# Patient Record
Sex: FEMALE | Race: WHITE | NOT HISPANIC OR LATINO
[De-identification: names, ages, dates, MRNs, and addresses within clinical notes are randomized per-mention and may not be internally consistent; named-entity substitution may affect disease eponyms.]

---

## 2015-07-06 RX ORDER — DILTIAZEM HCL 120 MG
1 CAPSULE, EXT RELEASE 24 HR ORAL
Qty: 0 | Refills: 0 | DISCHARGE
Start: 2015-07-06

## 2015-07-06 RX ORDER — ATORVASTATIN CALCIUM 80 MG/1
1 TABLET, FILM COATED ORAL
Qty: 0 | Refills: 0 | DISCHARGE
Start: 2015-07-06

## 2015-07-06 RX ORDER — DABIGATRAN ETEXILATE MESYLATE 150 MG/1
1 CAPSULE ORAL
Qty: 0 | Refills: 0 | DISCHARGE
Start: 2015-07-06

## 2017-01-03 ENCOUNTER — APPOINTMENT (OUTPATIENT)
Dept: PULMONOLOGY | Facility: CLINIC | Age: 82
End: 2017-01-03

## 2017-01-03 VITALS — OXYGEN SATURATION: 94 % | DIASTOLIC BLOOD PRESSURE: 70 MMHG | HEART RATE: 117 BPM | SYSTOLIC BLOOD PRESSURE: 110 MMHG

## 2017-02-14 ENCOUNTER — APPOINTMENT (OUTPATIENT)
Dept: PULMONOLOGY | Facility: CLINIC | Age: 82
End: 2017-02-14

## 2017-02-14 VITALS
HEART RATE: 107 BPM | OXYGEN SATURATION: 98 % | DIASTOLIC BLOOD PRESSURE: 74 MMHG | BODY MASS INDEX: 23.03 KG/M2 | WEIGHT: 130 LBS | SYSTOLIC BLOOD PRESSURE: 108 MMHG

## 2017-02-14 DIAGNOSIS — J45.901 ACUTE BRONCHITIS, UNSPECIFIED: ICD-10-CM

## 2017-02-14 DIAGNOSIS — R04.2 HEMOPTYSIS: ICD-10-CM

## 2017-02-14 DIAGNOSIS — J20.9 ACUTE BRONCHITIS, UNSPECIFIED: ICD-10-CM

## 2017-03-21 ENCOUNTER — APPOINTMENT (OUTPATIENT)
Dept: ORTHOPEDIC SURGERY | Facility: CLINIC | Age: 82
End: 2017-03-21

## 2017-03-21 VITALS — WEIGHT: 135 LBS | HEIGHT: 60 IN | BODY MASS INDEX: 26.5 KG/M2

## 2017-03-21 DIAGNOSIS — M23.91 UNSPECIFIED INTERNAL DERANGEMENT OF RIGHT KNEE: ICD-10-CM

## 2017-05-27 ENCOUNTER — EMERGENCY (EMERGENCY)
Facility: HOSPITAL | Age: 82
LOS: 1 days | Discharge: HOME | End: 2017-05-27
Attending: EMERGENCY MEDICINE | Admitting: EMERGENCY MEDICINE
Payer: MEDICARE

## 2017-05-27 VITALS
OXYGEN SATURATION: 97 % | SYSTOLIC BLOOD PRESSURE: 124 MMHG | DIASTOLIC BLOOD PRESSURE: 87 MMHG | RESPIRATION RATE: 18 BRPM | TEMPERATURE: 98 F | HEART RATE: 97 BPM

## 2017-05-27 VITALS
RESPIRATION RATE: 18 BRPM | DIASTOLIC BLOOD PRESSURE: 84 MMHG | SYSTOLIC BLOOD PRESSURE: 142 MMHG | OXYGEN SATURATION: 97 % | HEART RATE: 102 BPM | TEMPERATURE: 98 F

## 2017-05-27 DIAGNOSIS — Z98.49 CATARACT EXTRACTION STATUS, UNSPECIFIED EYE: ICD-10-CM

## 2017-05-27 DIAGNOSIS — Z98.890 OTHER SPECIFIED POSTPROCEDURAL STATES: ICD-10-CM

## 2017-05-27 DIAGNOSIS — Z79.891 LONG TERM (CURRENT) USE OF OPIATE ANALGESIC: ICD-10-CM

## 2017-05-27 DIAGNOSIS — I48.91 UNSPECIFIED ATRIAL FIBRILLATION: ICD-10-CM

## 2017-05-27 DIAGNOSIS — Z79.899 OTHER LONG TERM (CURRENT) DRUG THERAPY: ICD-10-CM

## 2017-05-27 DIAGNOSIS — Z98.42 CATARACT EXTRACTION STATUS, LEFT EYE: Chronic | ICD-10-CM

## 2017-05-27 DIAGNOSIS — I10 ESSENTIAL (PRIMARY) HYPERTENSION: ICD-10-CM

## 2017-05-27 DIAGNOSIS — K21.9 GASTRO-ESOPHAGEAL REFLUX DISEASE WITHOUT ESOPHAGITIS: ICD-10-CM

## 2017-05-27 DIAGNOSIS — Z98.89 OTHER SPECIFIED POSTPROCEDURAL STATES: Chronic | ICD-10-CM

## 2017-05-27 DIAGNOSIS — Z86.73 PERSONAL HISTORY OF TRANSIENT ISCHEMIC ATTACK (TIA), AND CEREBRAL INFARCTION WITHOUT RESIDUAL DEFICITS: ICD-10-CM

## 2017-05-27 DIAGNOSIS — M54.6 PAIN IN THORACIC SPINE: ICD-10-CM

## 2017-05-27 DIAGNOSIS — M54.9 DORSALGIA, UNSPECIFIED: ICD-10-CM

## 2017-05-27 DIAGNOSIS — E78.5 HYPERLIPIDEMIA, UNSPECIFIED: ICD-10-CM

## 2017-05-27 PROCEDURE — 99283 EMERGENCY DEPT VISIT LOW MDM: CPT | Mod: 25

## 2017-05-27 PROCEDURE — 99284 EMERGENCY DEPT VISIT MOD MDM: CPT | Mod: 25,GC

## 2017-05-27 PROCEDURE — 93010 ELECTROCARDIOGRAM REPORT: CPT

## 2017-05-27 PROCEDURE — 93005 ELECTROCARDIOGRAM TRACING: CPT

## 2017-05-27 RX ORDER — ACETAMINOPHEN 500 MG
650 TABLET ORAL ONCE
Qty: 0 | Refills: 0 | Status: COMPLETED | OUTPATIENT
Start: 2017-05-27 | End: 2017-05-27

## 2017-05-27 RX ADMIN — Medication 650 MILLIGRAM(S): at 04:52

## 2017-05-27 RX ADMIN — Medication 650 MILLIGRAM(S): at 05:05

## 2017-05-27 NOTE — ED PROVIDER NOTE - PLAN OF CARE
You were seen in the Emergency Department for back pain. Your examination and lab tests were reassuring. Please follow up with your regular physician this week for reevaluation. Please return to the Emergency Department if you have any new concerning symptoms such as severe pain, weakness, chest pain, shortness of breath or any other concerning symptoms. COntinue to take tylenol as needed for pain.

## 2017-05-27 NOTE — ED PROVIDER NOTE - OBJECTIVE STATEMENT
86yo female p/w back and shoulder pain since 8pm. Pain is left shoulder radiating down back, aggravated by movement, and palpation, alleviated by laying still, sharp. Pt. denies any trauma, weakness, numbness, chest pain, shortness of breath, falls. 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 PCP: ILDA Luna. Patient walks with walker at home.

## 2017-05-27 NOTE — ED ADULT NURSE NOTE - OBJECTIVE STATEMENT
85y female brought in by EMS c/o back pain. A&Ox3,  at bedside. Hx of central tremor causing voice to shake, CVA '12 with residual right sided weakness at baseline, afib on Pradaxa, right knee surgery. Today reports left upper back pain starting last night at 20:00, Left upper back pain wraps around back radiating to right hip. Patient denies trauma, denies taking any pain medication. Denies urinary symptoms, chest pain, sob, fever/chills, n/v/d.

## 2017-05-27 NOTE — ED PROVIDER NOTE - MEDICAL DECISION MAKING DETAILS
****ATTENDING**** 84yo female hx Afib on pradaxa, s/p CVA w right sided weakness, ambulatory w assistance pw L side back pain. Pain is sharp localized to upper back and worse w movement. Denies any trauma. No HA, neck pain, chest pain, abd pain, dysuria. No cough, URI, fever or chills. Patient did not take anything for the pain and was concerned that she is having a "heart attack". Patient on exam, Patient is awake,alert,oriented x 3. Patient is well appearing and in no acute distress. Patient's chest is clear to ausculation, +s1s2. Abdomen is soft nd/nt +BS. Back: No midline C/T/L tenderness. + reproducible paraspinal trapezius and upper thoracic tenderness. No cvat, No rash. Extremity with no swelling or calf tenderness.   Patient's EKG reviewed, Afib w st changes that are similar to ekg in 2015. Pt denies any chest pain or sob, pleurisy. Pain is reproducible on exam. Patient states she was just concerned and wanted to maek sure everything was okay. Recommended blood work and xray chest, patient declined and states she will take tylenol and wants to go home. Declined UA also. Family at bedside.

## 2017-05-27 NOTE — ED PROVIDER NOTE - CARE PLAN
Principal Discharge DX:	Back pain Principal Discharge DX:	Back pain  Instructions for follow-up, activity and diet:	You were seen in the Emergency Department for back pain. Your examination and lab tests were reassuring. Please follow up with your regular physician this week for reevaluation. Please return to the Emergency Department if you have any new concerning symptoms such as severe pain, weakness, chest pain, shortness of breath or any other concerning symptoms. COntinue to take tylenol as needed for pain.

## 2017-05-27 NOTE — ED PROVIDER NOTE - NEUROLOGICAL, MLM
Alert and oriented, no focal deficits, no motor or sensory deficits. right lower extremity weakness(no change per family/patient)

## 2017-05-27 NOTE — ED PROVIDER NOTE - PROGRESS NOTE DETAILS
****ATTENDING**** Patient states she feels much better and wants to go home. Declined blood work and xray understands and has capacity to make decisions. Explained to return for any worsening symptoms, and follow up with Dr. Adrian Luna. [id number]

## 2017-05-27 NOTE — ED ADULT NURSE NOTE - PMH
Atrial fibrillation    CVA (cerebral vascular accident)    GERD (gastroesophageal reflux disease)    HTN (Hypertension)    Hyperlipidemia    Lumbar stenosis    TIA (Transient Ischemic Attack)    Tremor, Essential

## 2017-06-13 ENCOUNTER — APPOINTMENT (OUTPATIENT)
Dept: PULMONOLOGY | Facility: CLINIC | Age: 82
End: 2017-06-13

## 2017-06-13 VITALS
OXYGEN SATURATION: 99 % | HEIGHT: 60 IN | WEIGHT: 135 LBS | BODY MASS INDEX: 26.5 KG/M2 | HEART RATE: 119 BPM | SYSTOLIC BLOOD PRESSURE: 110 MMHG | RESPIRATION RATE: 17 BRPM | DIASTOLIC BLOOD PRESSURE: 70 MMHG

## 2017-06-13 DIAGNOSIS — Z78.9 OTHER SPECIFIED HEALTH STATUS: ICD-10-CM

## 2017-06-13 DIAGNOSIS — Z86.73 PERSONAL HISTORY OF TRANSIENT ISCHEMIC ATTACK (TIA), AND CEREBRAL INFARCTION W/OUT RESIDUAL DEFICITS: ICD-10-CM

## 2017-06-13 DIAGNOSIS — Z72.3 LACK OF PHYSICAL EXERCISE: ICD-10-CM

## 2017-06-13 DIAGNOSIS — Z86.79 PERSONAL HISTORY OF OTHER DISEASES OF THE CIRCULATORY SYSTEM: ICD-10-CM

## 2017-06-13 RX ORDER — PREDNISONE 10 MG/1
10 TABLET ORAL
Qty: 100 | Refills: 0 | Status: DISCONTINUED | COMMUNITY
Start: 2017-01-03 | End: 2017-06-13

## 2017-06-30 ENCOUNTER — APPOINTMENT (OUTPATIENT)
Dept: INTERNAL MEDICINE | Facility: CLINIC | Age: 82
End: 2017-06-30

## 2017-06-30 ENCOUNTER — NON-APPOINTMENT (OUTPATIENT)
Age: 82
End: 2017-06-30

## 2017-06-30 ENCOUNTER — LABORATORY RESULT (OUTPATIENT)
Age: 82
End: 2017-06-30

## 2017-06-30 VITALS — WEIGHT: 132 LBS | BODY MASS INDEX: 25.91 KG/M2 | HEIGHT: 60 IN

## 2017-06-30 VITALS — DIASTOLIC BLOOD PRESSURE: 70 MMHG | HEART RATE: 100 BPM | RESPIRATION RATE: 14 BRPM | SYSTOLIC BLOOD PRESSURE: 130 MMHG

## 2017-06-30 DIAGNOSIS — I63.9 CEREBRAL INFARCTION, UNSPECIFIED: ICD-10-CM

## 2017-06-30 RX ORDER — LEVALBUTEROL HYDROCHLORIDE 1.25 MG/3ML
1.25 SOLUTION RESPIRATORY (INHALATION)
Qty: 90 | Refills: 3 | Status: DISCONTINUED | COMMUNITY
Start: 2017-01-03 | End: 2017-06-30

## 2017-06-30 RX ORDER — OMEPRAZOLE 20 MG/1
TABLET, DELAYED RELEASE ORAL
Refills: 0 | Status: ACTIVE | COMMUNITY

## 2017-06-30 RX ORDER — BUDESONIDE 0.5 MG/2ML
0.5 INHALANT ORAL TWICE DAILY
Qty: 60 | Refills: 4 | Status: DISCONTINUED | COMMUNITY
Start: 2017-01-03 | End: 2017-06-30

## 2017-07-20 LAB
APPEARANCE: ABNORMAL
BILIRUBIN URINE: NEGATIVE
BLOOD URINE: NEGATIVE
COLOR: YELLOW
GLUCOSE QUALITATIVE U: NORMAL MG/DL
KETONES URINE: NEGATIVE
LEUKOCYTE ESTERASE URINE: NEGATIVE
NITRITE URINE: NEGATIVE
PH URINE: 6.5
PROTEIN URINE: NEGATIVE MG/DL
SPECIFIC GRAVITY URINE: 1.02
UROBILINOGEN URINE: NORMAL MG/DL

## 2017-09-07 ENCOUNTER — APPOINTMENT (OUTPATIENT)
Dept: PULMONOLOGY | Facility: CLINIC | Age: 82
End: 2017-09-07
Payer: MEDICARE

## 2017-09-07 VITALS
HEIGHT: 65 IN | DIASTOLIC BLOOD PRESSURE: 60 MMHG | OXYGEN SATURATION: 100 % | SYSTOLIC BLOOD PRESSURE: 130 MMHG | HEART RATE: 108 BPM | BODY MASS INDEX: 22.49 KG/M2 | WEIGHT: 135 LBS | RESPIRATION RATE: 16 BRPM

## 2017-09-07 PROCEDURE — 99214 OFFICE O/P EST MOD 30 MIN: CPT | Mod: 25

## 2017-09-07 PROCEDURE — 94010 BREATHING CAPACITY TEST: CPT

## 2017-09-07 RX ORDER — CLARITHROMYCIN 500 MG/1
500 TABLET, FILM COATED ORAL
Qty: 20 | Refills: 0 | Status: DISCONTINUED | COMMUNITY
Start: 2017-06-23 | End: 2017-09-07

## 2017-09-07 RX ORDER — AZILSARTAN KAMEDOXOMIL 80 MG/1
TABLET ORAL
Refills: 0 | Status: DISCONTINUED | COMMUNITY
End: 2017-09-07

## 2018-01-11 ENCOUNTER — APPOINTMENT (OUTPATIENT)
Dept: PULMONOLOGY | Facility: CLINIC | Age: 83
End: 2018-01-11
Payer: MEDICARE

## 2018-01-11 VITALS
WEIGHT: 135 LBS | SYSTOLIC BLOOD PRESSURE: 120 MMHG | BODY MASS INDEX: 22.49 KG/M2 | HEART RATE: 102 BPM | OXYGEN SATURATION: 93 % | DIASTOLIC BLOOD PRESSURE: 80 MMHG | HEIGHT: 65 IN

## 2018-01-11 PROCEDURE — 99214 OFFICE O/P EST MOD 30 MIN: CPT | Mod: 25

## 2018-01-11 PROCEDURE — 94640 AIRWAY INHALATION TREATMENT: CPT

## 2018-05-15 ENCOUNTER — APPOINTMENT (OUTPATIENT)
Dept: PULMONOLOGY | Facility: CLINIC | Age: 83
End: 2018-05-15

## 2018-07-28 PROBLEM — M23.91 INTERNAL DERANGEMENT OF KNEE, RIGHT: Status: ACTIVE | Noted: 2017-03-21

## 2018-07-28 PROBLEM — Z86.73 HISTORY OF STROKE: Status: RESOLVED | Noted: 2017-03-21 | Resolved: 2018-07-28

## 2018-08-16 ENCOUNTER — NON-APPOINTMENT (OUTPATIENT)
Age: 83
End: 2018-08-16

## 2018-08-16 ENCOUNTER — APPOINTMENT (OUTPATIENT)
Dept: INTERNAL MEDICINE | Facility: CLINIC | Age: 83
End: 2018-08-16
Payer: MEDICARE

## 2018-08-16 VITALS — RESPIRATION RATE: 14 BRPM | HEART RATE: 86 BPM | SYSTOLIC BLOOD PRESSURE: 120 MMHG | DIASTOLIC BLOOD PRESSURE: 76 MMHG

## 2018-08-16 VITALS — WEIGHT: 133 LBS | BODY MASS INDEX: 26.11 KG/M2 | HEIGHT: 60 IN

## 2018-08-16 PROCEDURE — G0439: CPT

## 2018-08-16 PROCEDURE — 93000 ELECTROCARDIOGRAM COMPLETE: CPT

## 2018-08-16 PROCEDURE — 36415 COLL VENOUS BLD VENIPUNCTURE: CPT

## 2018-08-16 PROCEDURE — 99497 ADVNCD CARE PLAN 30 MIN: CPT | Mod: 33

## 2018-08-16 PROCEDURE — 99214 OFFICE O/P EST MOD 30 MIN: CPT | Mod: 25

## 2018-08-16 RX ORDER — AZILSARTAN KAMEDOXOMIL 40 MG/1
40 TABLET ORAL
Qty: 90 | Refills: 0 | Status: DISCONTINUED | COMMUNITY
Start: 2016-11-07 | End: 2018-08-16

## 2018-08-16 NOTE — HISTORY OF PRESENT ILLNESS
[PMH Reviewed and Updated] : past medical history reviewed and updated [PSH Reviewed and Updated] : past surgical history reviewed and updated [Family History Reviewed and Updated] : family history reviewed and updated [Medication and Allergies Reconciled] : medication and allergies reconciled [0] : 0 [Retired] : retired from work [None] : The patient has no concerns about alcohol abuse [Never] : has never used illicit drugs [Compliant with medications] : compliant with medications [Spouse] : spouse [Friends] : friends [Children] : children [Needs some help with ADLs] : need some help with ADLs [Does not drive] : does not drive [No history of falls] : no history of falls [Seatbelts] : seatbelts [Smoke Detectors] : smoke detectors [Carbon Monoxide Detector] : carbon monoxide detector [Bathroom Grab Bars] : bathroom grab bars [Fall Prevention Measures] : fall prevention measures [Sunscreen] : sunscreen [Advanced Directives Discussed] : discussed at today's visit [Over the Past 2 Weeks, Have You Felt Down, Depressed, or Hopeless?] : 1.) Over the past 2 weeks, have you felt down, depressed, or hopeless? No [Over the Past 2 Weeks, Have You Felt Little Interest or Pleasure Doing Things?] : 2.) Over the past 2 weeks, have you felt little interest or pleasure doing things? No [FreeTextEntry1] : Pt presents to establish care, as well as for management of her chronic medical conditions including Afib, HTN, hyperlipidemia, Asthma, s/p stroke.\par She sees psych and has been taking zoloft.\par She is without acute complaints\par \par Had injection for LBP with ?Dr Varner\par Saw cardiology - had negative stress test per patient

## 2018-08-16 NOTE — PHYSICAL EXAM
[Sclera] : the sclera and conjunctiva were normal [PERRL With Normal Accommodation] : pupils were equal in size, round, and reactive to light [Extraocular Movements] : extraocular movements were intact [Outer Ear] : the ears and nose were normal in appearance [Oropharynx] : the oropharynx was normal [Neck Appearance] : the appearance of the neck was normal [Neck Cervical Mass (___cm)] : no neck mass was observed [Jugular Venous Distention Increased] : there was no jugular-venous distention [Thyroid Diffuse Enlargement] : the thyroid was not enlarged [Thyroid Nodule] : there were no palpable thyroid nodules [Auscultation Breath Sounds / Voice Sounds] : lungs were clear to auscultation bilaterally [Heart Rate And Rhythm] : heart rate was normal and rhythm regular [Heart Sounds] : normal S1 and S2 [Heart Sounds Gallop] : no gallops [Murmurs] : no murmurs [Heart Sounds Pericardial Friction Rub] : no pericardial rub [Edema] : there was no peripheral edema [Bowel Sounds] : normal bowel sounds [Abdomen Soft] : soft [Abdomen Tenderness] : non-tender [Abdomen Mass (___ Cm)] : no abdominal mass palpated [Cervical Lymph Nodes Enlarged Posterior Bilaterally] : posterior cervical [Cervical Lymph Nodes Enlarged Anterior Bilaterally] : anterior cervical [Supraclavicular Lymph Nodes Enlarged Bilaterally] : supraclavicular [No CVA Tenderness] : no ~M costovertebral angle tenderness [No Spinal Tenderness] : no spinal tenderness [Abnormal Walk] : normal gait [Nail Clubbing] : no clubbing  or cyanosis of the fingernails [Musculoskeletal - Swelling] : no joint swelling seen [Motor Tone] : muscle strength and tone were normal [Skin Color & Pigmentation] : normal skin color and pigmentation [Skin Turgor] : normal skin turgor [] : no rash [Deep Tendon Reflexes (DTR)] : deep tendon reflexes were 2+ and symmetric [Sensation] : the sensory exam was normal to light touch and pinprick [No Focal Deficits] : no focal deficits [Oriented To Time, Place, And Person] : oriented to person, place, and time [Impaired Insight] : insight and judgment were intact [Affect] : the affect was normal [Breast Appearance] : normal in appearance [Breast Palpation Mass] : no palpable masses [Breast Abnormal Lactation (Galactorrhea)] : no nipple discharge [FreeTextEntry1] : tremor

## 2018-08-16 NOTE — HEALTH RISK ASSESSMENT
[Very Good] : ~his/her~  mood as very good [No falls in past year] : Patient reported no falls in the past year [0] : 2) Feeling down, depressed, or hopeless: Not at all (0) [] : No [HIV test declined] : HIV test declined [Hepatitis C test declined] : Hepatitis C test declined [Change in mental status noted] : No change in mental status noted [None] : None [] :  [Feels Safe at Home] : Feels safe at home [Fully functional (bathing, dressing, toileting, transferring, walking, feeding)] : Fully functional (bathing, dressing, toileting, transferring, walking, feeding) [Reports changes in hearing] : Reports no changes in hearing [Reports changes in vision] : Reports no changes in vision [Reports changes in dental health] : Reports no changes in dental health [de-identified] : hearing aides [Discussed at today's visit] : Advance Directives Discussed at today's visit [Relationship: ___] : Relationship: [unfilled] [FreeTextEntry4] : 16 minutes spent discussing HCP/ACP - she will send a copy ot the office for my review.

## 2018-08-16 NOTE — ASSESSMENT
[FreeTextEntry1] : Blood work was drawn and sent to the lab today. The patient has been instructed to call the office next week to discuss today's lab work.\par \par Advised Shingrix\par \par Obtain all records from cardiology\par \par Continue all meds\par \par Routine health counselling provided.\par \par Annual CC

## 2018-08-17 LAB
ALBUMIN SERPL ELPH-MCNC: 4.6 G/DL
ALP BLD-CCNC: 80 U/L
ALT SERPL-CCNC: 23 U/L
ANION GAP SERPL CALC-SCNC: 19 MMOL/L
AST SERPL-CCNC: 29 U/L
BASOPHILS # BLD AUTO: 0.02 K/UL
BASOPHILS NFR BLD AUTO: 0.3 %
BILIRUB SERPL-MCNC: 0.6 MG/DL
BUN SERPL-MCNC: 19 MG/DL
CALCIUM SERPL-MCNC: 10.3 MG/DL
CHLORIDE SERPL-SCNC: 101 MMOL/L
CHOLEST SERPL-MCNC: 161 MG/DL
CHOLEST/HDLC SERPL: 3.4 RATIO
CO2 SERPL-SCNC: 22 MMOL/L
CREAT SERPL-MCNC: 0.97 MG/DL
EOSINOPHIL # BLD AUTO: 0.07 K/UL
EOSINOPHIL NFR BLD AUTO: 0.9 %
FOLATE SERPL-MCNC: >20 NG/ML
GLUCOSE SERPL-MCNC: 72 MG/DL
HBA1C MFR BLD HPLC: 5.7 %
HCT VFR BLD CALC: 42 %
HDLC SERPL-MCNC: 47 MG/DL
HGB BLD-MCNC: 13.4 G/DL
IMM GRANULOCYTES NFR BLD AUTO: 0.1 %
LDLC SERPL CALC-MCNC: 92 MG/DL
LYMPHOCYTES # BLD AUTO: 2.67 K/UL
LYMPHOCYTES NFR BLD AUTO: 34.1 %
MAGNESIUM SERPL-MCNC: 2.2 MG/DL
MAN DIFF?: NORMAL
MCHC RBC-ENTMCNC: 30.3 PG
MCHC RBC-ENTMCNC: 31.9 GM/DL
MCV RBC AUTO: 95 FL
MONOCYTES # BLD AUTO: 0.79 K/UL
MONOCYTES NFR BLD AUTO: 10.1 %
NEUTROPHILS # BLD AUTO: 4.27 K/UL
NEUTROPHILS NFR BLD AUTO: 54.5 %
PLATELET # BLD AUTO: 296 K/UL
POTASSIUM SERPL-SCNC: 4.9 MMOL/L
PROT SERPL-MCNC: 7.8 G/DL
RBC # BLD: 4.42 M/UL
RBC # FLD: 14 %
SODIUM SERPL-SCNC: 142 MMOL/L
T4 FREE SERPL-MCNC: 1.1 NG/DL
T4 SERPL-MCNC: 6.9 UG/DL
TRIGL SERPL-MCNC: 112 MG/DL
TSH SERPL-ACNC: 3.52 UIU/ML
VIT B12 SERPL-MCNC: >2000 PG/ML
WBC # FLD AUTO: 7.83 K/UL

## 2018-08-27 LAB — 25(OH)D3 SERPL-MCNC: 43.5 NG/ML

## 2018-09-11 ENCOUNTER — APPOINTMENT (OUTPATIENT)
Dept: INTERNAL MEDICINE | Facility: CLINIC | Age: 83
End: 2018-09-11
Payer: MEDICARE

## 2018-09-11 VITALS — HEART RATE: 76 BPM | DIASTOLIC BLOOD PRESSURE: 70 MMHG | SYSTOLIC BLOOD PRESSURE: 110 MMHG | RESPIRATION RATE: 16 BRPM

## 2018-09-11 DIAGNOSIS — M79.1 MYALGIA: ICD-10-CM

## 2018-09-11 PROCEDURE — 99213 OFFICE O/P EST LOW 20 MIN: CPT

## 2018-09-11 NOTE — HISTORY OF PRESENT ILLNESS
[FreeTextEntry1] : Pain in left groin past 3 nights with laying down - keeps her awake  - gets better when sitting down for breakfast. \par No pain in lower back - \par Not down the leg\par No injury.\par Moved bowels this AM - ? helped.\par Got into car  - no pain

## 2018-09-11 NOTE — ASSESSMENT
[FreeTextEntry1] : Pt with groin pain and also left hip pain - \par Will need x-rays - and wishes to go to orthopedics for that\par Can use Heating pad and Tylenol.

## 2018-09-11 NOTE — PHYSICAL EXAM
[No Acute Distress] : no acute distress [Well Nourished] : well nourished [Well Developed] : well developed [Well-Appearing] : well-appearing [Normal Sclera/Conjunctiva] : normal sclera/conjunctiva [Soft] : abdomen soft [Non Tender] : non-tender [No HSM] : no HSM [Normal Bowel Sounds] : normal bowel sounds [No Hernias] : no hernias [No CVA Tenderness] : no CVA  tenderness [No Spinal Tenderness] : no spinal tenderness [de-identified] : DJD   -pain over left hip area - hurts with lifting of left leg  -good strength No focal weakness.

## 2018-09-14 ENCOUNTER — APPOINTMENT (OUTPATIENT)
Dept: ORTHOPEDIC SURGERY | Facility: CLINIC | Age: 83
End: 2018-09-14
Payer: MEDICARE

## 2018-09-14 VITALS — WEIGHT: 133 LBS | BODY MASS INDEX: 26.11 KG/M2 | HEIGHT: 60 IN

## 2018-09-14 DIAGNOSIS — M25.552 PAIN IN LEFT HIP: ICD-10-CM

## 2018-09-14 DIAGNOSIS — M16.0 BILATERAL PRIMARY OSTEOARTHRITIS OF HIP: ICD-10-CM

## 2018-09-14 PROCEDURE — 73502 X-RAY EXAM HIP UNI 2-3 VIEWS: CPT | Mod: LT

## 2018-09-14 PROCEDURE — 99214 OFFICE O/P EST MOD 30 MIN: CPT

## 2018-09-20 PROBLEM — M25.552 ACUTE PAIN OF LEFT HIP: Status: ACTIVE | Noted: 2018-09-14

## 2018-10-04 ENCOUNTER — APPOINTMENT (OUTPATIENT)
Dept: ORTHOPEDIC SURGERY | Facility: CLINIC | Age: 83
End: 2018-10-04

## 2019-01-11 ENCOUNTER — OTHER (OUTPATIENT)
Age: 84
End: 2019-01-11

## 2019-05-17 ENCOUNTER — APPOINTMENT (OUTPATIENT)
Dept: INTERNAL MEDICINE | Facility: CLINIC | Age: 84
End: 2019-05-17
Payer: MEDICARE

## 2019-05-17 VITALS — DIASTOLIC BLOOD PRESSURE: 70 MMHG | RESPIRATION RATE: 14 BRPM | SYSTOLIC BLOOD PRESSURE: 120 MMHG | HEART RATE: 72 BPM

## 2019-05-17 PROCEDURE — 36415 COLL VENOUS BLD VENIPUNCTURE: CPT

## 2019-05-17 PROCEDURE — 99214 OFFICE O/P EST MOD 30 MIN: CPT | Mod: 25

## 2019-05-17 RX ORDER — OLMESARTAN MEDOXOMIL 20 MG/1
20 TABLET, FILM COATED ORAL
Refills: 0 | Status: DISCONTINUED | COMMUNITY
Start: 2018-08-16 | End: 2019-05-17

## 2019-06-21 ENCOUNTER — MEDICATION RENEWAL (OUTPATIENT)
Age: 84
End: 2019-06-21

## 2019-06-21 LAB
ALBUMIN SERPL ELPH-MCNC: 4.4 G/DL
ALP BLD-CCNC: 92 U/L
ALT SERPL-CCNC: 23 U/L
ANION GAP SERPL CALC-SCNC: 11 MMOL/L
AST SERPL-CCNC: 24 U/L
BASOPHILS # BLD AUTO: 0.03 K/UL
BASOPHILS NFR BLD AUTO: 0.4 %
BILIRUB SERPL-MCNC: 0.3 MG/DL
BUN SERPL-MCNC: 17 MG/DL
CALCIUM SERPL-MCNC: 9.8 MG/DL
CHLORIDE SERPL-SCNC: 107 MMOL/L
CHOLEST SERPL-MCNC: 164 MG/DL
CHOLEST/HDLC SERPL: 3.9 RATIO
CO2 SERPL-SCNC: 25 MMOL/L
CREAT SERPL-MCNC: 0.84 MG/DL
DIGOXIN SERPL-MCNC: 0.4 NG/ML
EOSINOPHIL # BLD AUTO: 0.09 K/UL
EOSINOPHIL NFR BLD AUTO: 1.2 %
FOLATE SERPL-MCNC: >20 NG/ML
GLUCOSE SERPL-MCNC: 93 MG/DL
HCT VFR BLD CALC: 40.8 %
HDLC SERPL-MCNC: 42 MG/DL
HGB BLD-MCNC: 12.8 G/DL
IMM GRANULOCYTES NFR BLD AUTO: 0.1 %
LDLC SERPL CALC-MCNC: 62 MG/DL
LYMPHOCYTES # BLD AUTO: 3.01 K/UL
LYMPHOCYTES NFR BLD AUTO: 38.5 %
MAN DIFF?: NORMAL
MCHC RBC-ENTMCNC: 29.7 PG
MCHC RBC-ENTMCNC: 31.4 GM/DL
MCV RBC AUTO: 94.7 FL
MONOCYTES # BLD AUTO: 0.72 K/UL
MONOCYTES NFR BLD AUTO: 9.2 %
NEUTROPHILS # BLD AUTO: 3.95 K/UL
NEUTROPHILS NFR BLD AUTO: 50.6 %
PLATELET # BLD AUTO: 302 K/UL
POTASSIUM SERPL-SCNC: 4.9 MMOL/L
PROT SERPL-MCNC: 6.7 G/DL
RBC # BLD: 4.31 M/UL
RBC # FLD: 13.7 %
SODIUM SERPL-SCNC: 143 MMOL/L
T4 FREE SERPL-MCNC: 1 NG/DL
T4 SERPL-MCNC: 5.4 UG/DL
TRIGL SERPL-MCNC: 302 MG/DL
TSH SERPL-ACNC: 4 UIU/ML
VIT B12 SERPL-MCNC: 1859 PG/ML
WBC # FLD AUTO: 7.81 K/UL

## 2019-08-02 ENCOUNTER — APPOINTMENT (OUTPATIENT)
Dept: NEUROLOGY | Facility: CLINIC | Age: 84
End: 2019-08-02
Payer: MEDICARE

## 2019-08-02 VITALS
WEIGHT: 130 LBS | HEIGHT: 60 IN | HEART RATE: 84 BPM | DIASTOLIC BLOOD PRESSURE: 82 MMHG | SYSTOLIC BLOOD PRESSURE: 136 MMHG | BODY MASS INDEX: 25.52 KG/M2

## 2019-08-02 PROCEDURE — 99205 OFFICE O/P NEW HI 60 MIN: CPT

## 2019-08-02 NOTE — CONSULT LETTER
[Dear  ___] : Dear ~ALEC, [Consult Closing:] : Thank you very much for allowing me to participate in the care of this patient.  If you have any questions, please do not hesitate to contact me. [Consult Letter:] : I had the pleasure of evaluating your patient, [unfilled]. [Sincerely,] : Sincerely, [FreeTextEntry2] : Andrew Bernabe MD\par [id number]\[place] [address] [FreeTextEntry3] : Pavel Ryder MD, PhD\par Attending Neurologist\par Division of Movement Disorders\par [facility]\par  of Neurology\par [facility] [employer] [facility]\par \par

## 2019-08-02 NOTE — PHYSICAL EXAM
[Person] : oriented to person [Place] : oriented to place [Time] : oriented to time [Registration Intact] : recent registration memory intact [Motor Handedness Right-Handed] : the patient is right hand dominant [2+] : Patella right 2+ [3+] : Ankle jerk right 3+ [1+] : Ankle jerk left 1+ [Plantar Reflex Right Only] : abnormal on the right [Oriented To Time, Place, And Person] : oriented to person, place, and time [Affect] : the affect was normal [Short Term Intact] : short term memory intact [Naming Objects] : no difficulty naming common objects [Writing A Sentence] : no difficulty writing a sentence [Reading] : reading intact [Fluency] : fluency intact [Cranial Nerves Optic (II)] : visual acuity intact bilaterally,  visual fields full to confrontation, pupils equal round and reactive to light [Cranial Nerves Trigeminal (V)] : facial sensation intact symmetrically [Cranial Nerves Oculomotor (III)] : extraocular motion intact [Cranial Nerves Facial (VII)] : face symmetrical [Cranial Nerves Glossopharyngeal (IX)] : tongue and palate midline [Cranial Nerves Vestibulocochlear (VIII)] : hearing was intact bilaterally [Cranial Nerves Accessory (XI - Cranial And Spinal)] : head turning and shoulder shrug symmetric [Cranial Nerves Hypoglossal (XII)] : there was no tongue deviation with protrusion [Sensation Tactile Decrease] : light touch was intact [Proprioception] : proprioception was intact [Sensation Vibration Decrease] : vibration was intact [Sclera] : the sclera and conjunctiva were normal [PERRL With Normal Accommodation] : pupils were equal in size, round, reactive to light, with normal accommodation [Extraocular Movements] : extraocular movements were intact [Outer Ear] : the ears and nose were normal in appearance [Hearing Threshold Finger Rub Not [place]] : hearing was normal [Neck Appearance] : the appearance of the neck was normal [Respiration, Rhythm And Depth] : normal respiratory rhythm and effort [Auscultation Breath Sounds / Voice Sounds] : lungs were clear to auscultation bilaterally [Heart Rate And Rhythm] : heart rate was normal and rhythm regular [Heart Sounds] : normal S1 and S2 [Arterial Pulses Carotid] : carotid pulses were normal with no bruits [Bowel Sounds] : normal bowel sounds [Abdomen Soft] : soft [Abdomen Tenderness] : non-tender [No CVA Tenderness] : no ~M costovertebral angle tenderness [Nail Clubbing] : no clubbing  or cyanosis of the fingernails [Musculoskeletal - Swelling] : no joint swelling seen [Skin Color & Pigmentation] : normal skin color and pigmentation [] : no rash [Dysdiadochokinesia Bilaterally] : not present [Coordination - Dysmetria Impaired Finger-to-Nose Bilateral] : not present [Coordination - Dysmetria Impaired Heel-to-Shin Bilateral] : not present [Plantar Reflex Left Only] : normal on the left [FreeTextEntry1] : Facial expression and volume of speech were normal. Extraocular movements were intact with normal saccades, smooth pursuit, and no square wave jerks seen. Tone was normal throughout. Rapid alternating movements and foot tap where normal bilaterally. She was able to stand up unassisted. Gait was slow with the right foot drag and small steps.\par She had a voice tremor, present with saying both A and E. There was no cutting out or whispering in her voice. She had a bilateral postural and kinetic and tremor, worse on the left, but generally very mild. There was no rest tremor. [FreeTextEntry6] : Mild right leg weakness.

## 2019-08-02 NOTE — DISCUSSION/SUMMARY
[FreeTextEntry1] : In summary, the patient is a 87-year-old right-handed woman, with a history of stroke affecting her right side, as well as voice tremor for the past 15-20 years. The examination was significant for subtle signs of a right-sided stroke with dragging of the right foot. She had a voice tremor as well as a much milder postural and kinetic tremor of the hands. Overall, the history and examination regarding the tremor is indeed most consistent with diagnosis of essential tremor. She did not have any features that would be suggestive of a spasmodic dysphonia. There were no findings on examination that would point to another movement disorder. The alcohol responsiveness is also consistent with essential tremor.\par As for treatment, she has already tried the two mainstays, primidone and propranolol, reportedly a fairly high doses. Thus, I do not think it is worthwhile repeating those. Occasionally, tremor such as this can response to topiramate or clonazepam, and I recommended a trial of the latter, at low dose to begin with. Surgical treatments for essential tremor unfortunately would not likely be appropriate for voice tremor.\par \par I spent approximately 1 hour with the patient and her , examining and discussing the above findings and impression. She will followup with her regular neurologist, but they will call me  to report on the medication trial, and we can try different medications over the phone as well. She is welcome to return to my office with future questions or for exam changes.

## 2019-08-02 NOTE — HISTORY OF PRESENT ILLNESS
[FreeTextEntry1] : The patient is an 87-year-old right-handed woman who had a stroke 7 years ago, which affected her right side but largely improved with rehabilitation. She still needs a walker because of her right leg. The stroke was likely in the setting of theta fibrillation, and she is on anticoagulation for this.\par She comes referred for evaluation of essential tremor of her voice. She has had this tremor for 15-20 years, and it largely affects her voice, with a mild hand tremor and no leg tremor. Initially she had an injection of botulinum toxin at the [facility], which left her unable to speak for several months. She also has been evaluated by Dewayne Norton and Ana, who agree with the diagnosis of essential tremor. She has tried both propranolol and primidone, had increasing doses, with no benefit. The tremor does improve with alcohol, though she no longer drinks as she has trouble tolerating alcohol.\par The hand tremor does not interfere with activities of living. The voice tremor interferes socially. She has no dysphagia or drooling. Her voice does not out on her. Therapy no changes in her handwriting. She has no trouble turning over in bed. She has no history of acting out her dreams. Her walking is unchanged. She has not fallen. No family history of essential tremor or other movement disorders.\par \par

## 2019-08-30 ENCOUNTER — APPOINTMENT (OUTPATIENT)
Dept: INTERNAL MEDICINE | Facility: CLINIC | Age: 84
End: 2019-08-30
Payer: MEDICARE

## 2019-08-30 ENCOUNTER — NON-APPOINTMENT (OUTPATIENT)
Age: 84
End: 2019-08-30

## 2019-08-30 VITALS — SYSTOLIC BLOOD PRESSURE: 122 MMHG | HEART RATE: 78 BPM | RESPIRATION RATE: 14 BRPM | DIASTOLIC BLOOD PRESSURE: 80 MMHG

## 2019-08-30 VITALS — HEIGHT: 59.5 IN | BODY MASS INDEX: 26.66 KG/M2 | WEIGHT: 134 LBS

## 2019-08-30 PROCEDURE — 93000 ELECTROCARDIOGRAM COMPLETE: CPT

## 2019-08-30 PROCEDURE — G0444 DEPRESSION SCREEN ANNUAL: CPT | Mod: 59

## 2019-08-30 PROCEDURE — 36415 COLL VENOUS BLD VENIPUNCTURE: CPT

## 2019-08-30 PROCEDURE — G0439: CPT

## 2019-08-30 PROCEDURE — 99214 OFFICE O/P EST MOD 30 MIN: CPT | Mod: 25

## 2019-08-30 PROCEDURE — 99497 ADVNCD CARE PLAN 30 MIN: CPT | Mod: 33

## 2019-08-30 RX ORDER — DIGOXIN 125 UG/1
125 TABLET ORAL
Qty: 45 | Refills: 0 | Status: DISCONTINUED | COMMUNITY
Start: 2018-07-16 | End: 2019-08-30

## 2019-08-30 RX ORDER — DILTIAZEM HYDROCHLORIDE 240 MG/1
240 CAPSULE, EXTENDED RELEASE ORAL
Qty: 90 | Refills: 0 | Status: DISCONTINUED | COMMUNITY
Start: 2018-06-18 | End: 2019-08-30

## 2019-08-30 RX ORDER — IPRATROPIUM BROMIDE 42 UG/1
0.06 SPRAY NASAL 3 TIMES DAILY
Qty: 3 | Refills: 1 | Status: DISCONTINUED | COMMUNITY
Start: 2017-09-07 | End: 2019-08-30

## 2019-08-30 RX ORDER — IPRATROPIUM BROMIDE 42 UG/1
0.06 SPRAY NASAL 3 TIMES DAILY
Qty: 3 | Refills: 1 | Status: DISCONTINUED | COMMUNITY
Start: 2017-02-14 | End: 2019-08-30

## 2019-08-30 RX ORDER — BUDESONIDE 0.5 MG/2ML
0.5 INHALANT ORAL
Qty: 60 | Refills: 5 | Status: DISCONTINUED | COMMUNITY
Start: 2018-01-11 | End: 2019-08-30

## 2019-08-30 RX ORDER — CLONAZEPAM 0.5 MG/1
0.5 TABLET ORAL
Qty: 30 | Refills: 0 | Status: DISCONTINUED | COMMUNITY
Start: 2019-08-02 | End: 2019-08-30

## 2019-08-30 RX ORDER — LEVALBUTEROL HYDROCHLORIDE 0.63 MG/3ML
0.63 SOLUTION RESPIRATORY (INHALATION)
Qty: 120 | Refills: 3 | Status: DISCONTINUED | COMMUNITY
Start: 2018-01-11 | End: 2019-08-30

## 2019-08-30 NOTE — ASSESSMENT
[FreeTextEntry1] : Blood work was drawn and sent to the lab today. The patient has been instructed to call the office next week to discuss today's lab work.\par \par \par Obtain all records from cardiology\par \par Continue all meds\par \par 16 minutes spent with patient discussing ACP/HCP. She has designated a proxy, and the proxy is aware of the patients wishes and will comply.\par \par \par Routine health counselling provided.\par F/U six months\par \par Annual CC

## 2019-08-30 NOTE — HEALTH RISK ASSESSMENT
[] : No [Very Good] : ~his/her~  mood as very good [No falls in past year] : Patient reported no falls in the past year [0] : 2) Feeling down, depressed, or hopeless: Not at all (0) [HIV test declined] : HIV test declined [Hepatitis C test declined] : Hepatitis C test declined [Change in mental status noted] : No change in mental status noted [None] : None [] :  [Feels Safe at Home] : Feels safe at home [Fully functional (bathing, dressing, toileting, transferring, walking, feeding)] : Fully functional (bathing, dressing, toileting, transferring, walking, feeding) [Reports changes in hearing] : Reports no changes in hearing [Reports changes in vision] : Reports no changes in vision [Reports changes in dental health] : Reports no changes in dental health [de-identified] : hearing aides [Discussed at today's visit] : Advance Directives Discussed at today's visit [Relationship: ___] : Relationship: [unfilled] [FreeTextEntry4] : 16 minutes spent discussing HCP/ACP - she will send a copy ot the office for my review.

## 2019-08-30 NOTE — PHYSICAL EXAM
[Sclera] : the sclera and conjunctiva were normal [PERRL With Normal Accommodation] : pupils were equal in size, round, and reactive to light [Extraocular Movements] : extraocular movements were intact [Oropharynx] : the oropharynx was normal [Outer Ear] : the ears and nose were normal in appearance [Neck Appearance] : the appearance of the neck was normal [Neck Cervical Mass (___cm)] : no neck mass was observed [Jugular Venous Distention Increased] : there was no jugular-venous distention [Thyroid Diffuse Enlargement] : the thyroid was not enlarged [Thyroid Nodule] : there were no palpable thyroid nodules [Auscultation Breath Sounds / Voice Sounds] : lungs were clear to auscultation bilaterally [Heart Rate And Rhythm] : heart rate was normal and rhythm regular [Heart Sounds] : normal S1 and S2 [Murmurs] : no murmurs [Heart Sounds Gallop] : no gallops [Heart Sounds Pericardial Friction Rub] : no pericardial rub [Edema] : there was no peripheral edema [Abdomen Soft] : soft [Bowel Sounds] : normal bowel sounds [Abdomen Tenderness] : non-tender [Abdomen Mass (___ Cm)] : no abdominal mass palpated [Cervical Lymph Nodes Enlarged Posterior Bilaterally] : posterior cervical [Cervical Lymph Nodes Enlarged Anterior Bilaterally] : anterior cervical [Supraclavicular Lymph Nodes Enlarged Bilaterally] : supraclavicular [No CVA Tenderness] : no ~M costovertebral angle tenderness [No Spinal Tenderness] : no spinal tenderness [Nail Clubbing] : no clubbing  or cyanosis of the fingernails [Abnormal Walk] : normal gait [Motor Tone] : muscle strength and tone were normal [Musculoskeletal - Swelling] : no joint swelling seen [Skin Color & Pigmentation] : normal skin color and pigmentation [Skin Turgor] : normal skin turgor [] : no rash [Sensation] : the sensory exam was normal to light touch and pinprick [Deep Tendon Reflexes (DTR)] : deep tendon reflexes were 2+ and symmetric [FreeTextEntry1] : tremor [No Focal Deficits] : no focal deficits [Impaired Insight] : insight and judgment were intact [Oriented To Time, Place, And Person] : oriented to person, place, and time [Affect] : the affect was normal

## 2019-08-30 NOTE — HISTORY OF PRESENT ILLNESS
[PSH Reviewed and Updated] : past surgical history reviewed and updated [PMH Reviewed and Updated] : past medical history reviewed and updated [Family History Reviewed and Updated] : family history reviewed and updated [Medication and Allergies Reconciled] : medication and allergies reconciled [0] : 0 [Over the Past 2 Weeks, Have You Felt Down, Depressed, or Hopeless?] : 1.) Over the past 2 weeks, have you felt down, depressed, or hopeless? No [Over the Past 2 Weeks, Have You Felt Little Interest or Pleasure Doing Things?] : 2.) Over the past 2 weeks, have you felt little interest or pleasure doing things? No [Retired] : retired from work [Never] : has never used illicit drugs [None] : The patient has no concerns about alcohol abuse [Spouse] : spouse [Compliant with medications] : compliant with medications [Friends] : friends [Children] : children [Needs some help with ADLs] : need some help with ADLs [Does not drive] : does not drive [No history of falls] : no history of falls [Seatbelts] : seatbelts [Bathroom Grab Bars] : bathroom grab bars [Smoke Detectors] : smoke detectors [Carbon Monoxide Detector] : carbon monoxide detector [Fall Prevention Measures] : fall prevention measures [Advanced Directives Discussed] : discussed at today's visit [Sunscreen] : sunscreen [FreeTextEntry1] : Pt presents to establish care, as well as for management of her chronic medical conditions including Afib, HTN, hyperlipidemia, Asthma, s/p stroke.\par  taking zoloft - psychiatrist has retired\par She is without acute complaints\par \par Follows with cardiology regularly.

## 2019-09-05 LAB
25(OH)D3 SERPL-MCNC: 38.5 NG/ML
ALBUMIN SERPL ELPH-MCNC: 4.7 G/DL
ALP BLD-CCNC: 94 U/L
ALT SERPL-CCNC: 30 U/L
ANION GAP SERPL CALC-SCNC: 15 MMOL/L
AST SERPL-CCNC: 32 U/L
BASOPHILS # BLD AUTO: 0.04 K/UL
BASOPHILS NFR BLD AUTO: 0.5 %
BILIRUB SERPL-MCNC: 0.4 MG/DL
BUN SERPL-MCNC: 23 MG/DL
CALCIUM SERPL-MCNC: 9.8 MG/DL
CHLORIDE SERPL-SCNC: 103 MMOL/L
CHOLEST SERPL-MCNC: 164 MG/DL
CHOLEST/HDLC SERPL: 3.2 RATIO
CO2 SERPL-SCNC: 22 MMOL/L
CREAT SERPL-MCNC: 0.85 MG/DL
DIGOXIN SERPL-MCNC: 1 NG/ML
EOSINOPHIL # BLD AUTO: 0.06 K/UL
EOSINOPHIL NFR BLD AUTO: 0.8 %
ESTIMATED AVERAGE GLUCOSE: 114 MG/DL
FOLATE SERPL-MCNC: >20 NG/ML
GLUCOSE SERPL-MCNC: 79 MG/DL
HBA1C MFR BLD HPLC: 5.6 %
HCT VFR BLD CALC: 42.1 %
HDLC SERPL-MCNC: 51 MG/DL
HGB BLD-MCNC: 13 G/DL
IMM GRANULOCYTES NFR BLD AUTO: 0.3 %
LDLC SERPL CALC-MCNC: 79 MG/DL
LYMPHOCYTES # BLD AUTO: 2.63 K/UL
LYMPHOCYTES NFR BLD AUTO: 33.5 %
MAGNESIUM SERPL-MCNC: 2.2 MG/DL
MAN DIFF?: NORMAL
MCHC RBC-ENTMCNC: 29.8 PG
MCHC RBC-ENTMCNC: 30.9 GM/DL
MCV RBC AUTO: 96.6 FL
MONOCYTES # BLD AUTO: 0.67 K/UL
MONOCYTES NFR BLD AUTO: 8.5 %
NEUTROPHILS # BLD AUTO: 4.43 K/UL
NEUTROPHILS NFR BLD AUTO: 56.4 %
PLATELET # BLD AUTO: 306 K/UL
POTASSIUM SERPL-SCNC: 4.3 MMOL/L
PROT SERPL-MCNC: 6.9 G/DL
RBC # BLD: 4.36 M/UL
RBC # FLD: 13.9 %
SODIUM SERPL-SCNC: 140 MMOL/L
T4 FREE SERPL-MCNC: 0.9 NG/DL
T4 SERPL-MCNC: 5 UG/DL
TRIGL SERPL-MCNC: 172 MG/DL
TSH SERPL-ACNC: 4.51 UIU/ML
VIT B12 SERPL-MCNC: 1744 PG/ML
WBC # FLD AUTO: 7.85 K/UL

## 2019-11-27 ENCOUNTER — OTHER (OUTPATIENT)
Age: 84
End: 2019-11-27

## 2019-12-26 ENCOUNTER — NON-APPOINTMENT (OUTPATIENT)
Age: 84
End: 2019-12-26

## 2019-12-26 ENCOUNTER — APPOINTMENT (OUTPATIENT)
Dept: OPHTHALMOLOGY | Facility: CLINIC | Age: 84
End: 2019-12-26
Payer: MEDICARE

## 2019-12-26 PROCEDURE — 92134 CPTRZ OPH DX IMG PST SGM RTA: CPT

## 2019-12-26 PROCEDURE — 92015 DETERMINE REFRACTIVE STATE: CPT

## 2019-12-26 PROCEDURE — 92012 INTRM OPH EXAM EST PATIENT: CPT

## 2020-02-10 ENCOUNTER — EMERGENCY (EMERGENCY)
Facility: HOSPITAL | Age: 85
LOS: 1 days | Discharge: HOME | End: 2020-02-10
Attending: EMERGENCY MEDICINE
Payer: MEDICARE

## 2020-02-10 VITALS
HEART RATE: 72 BPM | RESPIRATION RATE: 19 BRPM | OXYGEN SATURATION: 98 % | DIASTOLIC BLOOD PRESSURE: 74 MMHG | SYSTOLIC BLOOD PRESSURE: 136 MMHG | TEMPERATURE: 98 F

## 2020-02-10 VITALS
WEIGHT: 134.92 LBS | RESPIRATION RATE: 18 BRPM | HEART RATE: 100 BPM | HEIGHT: 60 IN | OXYGEN SATURATION: 97 % | TEMPERATURE: 98 F | SYSTOLIC BLOOD PRESSURE: 141 MMHG | DIASTOLIC BLOOD PRESSURE: 97 MMHG

## 2020-02-10 DIAGNOSIS — Z98.89 OTHER SPECIFIED POSTPROCEDURAL STATES: Chronic | ICD-10-CM

## 2020-02-10 DIAGNOSIS — Z98.42 CATARACT EXTRACTION STATUS, LEFT EYE: Chronic | ICD-10-CM

## 2020-02-10 LAB
ALBUMIN SERPL ELPH-MCNC: 4.1 G/DL — SIGNIFICANT CHANGE UP (ref 3.3–5)
ALP SERPL-CCNC: 101 U/L — SIGNIFICANT CHANGE UP (ref 40–120)
ALT FLD-CCNC: 23 U/L — SIGNIFICANT CHANGE UP (ref 10–45)
ANION GAP SERPL CALC-SCNC: 16 MMOL/L — SIGNIFICANT CHANGE UP (ref 5–17)
APPEARANCE UR: ABNORMAL
AST SERPL-CCNC: 19 U/L — SIGNIFICANT CHANGE UP (ref 10–40)
BACTERIA # UR AUTO: ABNORMAL
BASOPHILS # BLD AUTO: 0.04 K/UL — SIGNIFICANT CHANGE UP (ref 0–0.2)
BASOPHILS NFR BLD AUTO: 0.4 % — SIGNIFICANT CHANGE UP (ref 0–2)
BILIRUB SERPL-MCNC: 0.6 MG/DL — SIGNIFICANT CHANGE UP (ref 0.2–1.2)
BILIRUB UR-MCNC: NEGATIVE — SIGNIFICANT CHANGE UP
BUN SERPL-MCNC: 17 MG/DL — SIGNIFICANT CHANGE UP (ref 7–23)
CALCIUM SERPL-MCNC: 9.8 MG/DL — SIGNIFICANT CHANGE UP (ref 8.4–10.5)
CHLORIDE SERPL-SCNC: 104 MMOL/L — SIGNIFICANT CHANGE UP (ref 96–108)
CO2 SERPL-SCNC: 21 MMOL/L — LOW (ref 22–31)
COLOR SPEC: SIGNIFICANT CHANGE UP
CREAT SERPL-MCNC: 0.83 MG/DL — SIGNIFICANT CHANGE UP (ref 0.5–1.3)
DIFF PNL FLD: NEGATIVE — SIGNIFICANT CHANGE UP
EOSINOPHIL # BLD AUTO: 0.19 K/UL — SIGNIFICANT CHANGE UP (ref 0–0.5)
EOSINOPHIL NFR BLD AUTO: 1.8 % — SIGNIFICANT CHANGE UP (ref 0–6)
EPI CELLS # UR: 3 /HPF — SIGNIFICANT CHANGE UP
GLUCOSE SERPL-MCNC: 111 MG/DL — HIGH (ref 70–99)
GLUCOSE UR QL: NEGATIVE — SIGNIFICANT CHANGE UP
HCT VFR BLD CALC: 39.3 % — SIGNIFICANT CHANGE UP (ref 34.5–45)
HGB BLD-MCNC: 12.7 G/DL — SIGNIFICANT CHANGE UP (ref 11.5–15.5)
HYALINE CASTS # UR AUTO: 0 /LPF — SIGNIFICANT CHANGE UP (ref 0–2)
IMM GRANULOCYTES NFR BLD AUTO: 0.4 % — SIGNIFICANT CHANGE UP (ref 0–1.5)
KETONES UR-MCNC: NEGATIVE — SIGNIFICANT CHANGE UP
LEUKOCYTE ESTERASE UR-ACNC: ABNORMAL
LYMPHOCYTES # BLD AUTO: 1.84 K/UL — SIGNIFICANT CHANGE UP (ref 1–3.3)
LYMPHOCYTES # BLD AUTO: 17.2 % — SIGNIFICANT CHANGE UP (ref 13–44)
MCHC RBC-ENTMCNC: 30 PG — SIGNIFICANT CHANGE UP (ref 27–34)
MCHC RBC-ENTMCNC: 32.3 GM/DL — SIGNIFICANT CHANGE UP (ref 32–36)
MCV RBC AUTO: 92.7 FL — SIGNIFICANT CHANGE UP (ref 80–100)
MONOCYTES # BLD AUTO: 0.91 K/UL — HIGH (ref 0–0.9)
MONOCYTES NFR BLD AUTO: 8.5 % — SIGNIFICANT CHANGE UP (ref 2–14)
NEUTROPHILS # BLD AUTO: 7.69 K/UL — HIGH (ref 1.8–7.4)
NEUTROPHILS NFR BLD AUTO: 71.7 % — SIGNIFICANT CHANGE UP (ref 43–77)
NITRITE UR-MCNC: POSITIVE
NRBC # BLD: 0 /100 WBCS — SIGNIFICANT CHANGE UP (ref 0–0)
PH UR: 8 — SIGNIFICANT CHANGE UP (ref 5–8)
PLATELET # BLD AUTO: 264 K/UL — SIGNIFICANT CHANGE UP (ref 150–400)
POTASSIUM SERPL-MCNC: 3.9 MMOL/L — SIGNIFICANT CHANGE UP (ref 3.5–5.3)
POTASSIUM SERPL-SCNC: 3.9 MMOL/L — SIGNIFICANT CHANGE UP (ref 3.5–5.3)
PROT SERPL-MCNC: 7.5 G/DL — SIGNIFICANT CHANGE UP (ref 6–8.3)
PROT UR-MCNC: ABNORMAL
RBC # BLD: 4.24 M/UL — SIGNIFICANT CHANGE UP (ref 3.8–5.2)
RBC # FLD: 13.1 % — SIGNIFICANT CHANGE UP (ref 10.3–14.5)
RBC CASTS # UR COMP ASSIST: 0 /HPF — SIGNIFICANT CHANGE UP (ref 0–4)
SODIUM SERPL-SCNC: 141 MMOL/L — SIGNIFICANT CHANGE UP (ref 135–145)
SP GR SPEC: 1.02 — SIGNIFICANT CHANGE UP (ref 1.01–1.02)
TROPONIN T, HIGH SENSITIVITY RESULT: 7 NG/L — SIGNIFICANT CHANGE UP (ref 0–51)
TROPONIN T, HIGH SENSITIVITY RESULT: 7 NG/L — SIGNIFICANT CHANGE UP (ref 0–51)
UROBILINOGEN FLD QL: NEGATIVE — SIGNIFICANT CHANGE UP
WBC # BLD: 10.71 K/UL — HIGH (ref 3.8–10.5)
WBC # FLD AUTO: 10.71 K/UL — HIGH (ref 3.8–10.5)
WBC UR QL: 6 /HPF — HIGH (ref 0–5)

## 2020-02-10 PROCEDURE — 96374 THER/PROPH/DIAG INJ IV PUSH: CPT

## 2020-02-10 PROCEDURE — 71045 X-RAY EXAM CHEST 1 VIEW: CPT

## 2020-02-10 PROCEDURE — 80053 COMPREHEN METABOLIC PANEL: CPT

## 2020-02-10 PROCEDURE — 71045 X-RAY EXAM CHEST 1 VIEW: CPT | Mod: 26

## 2020-02-10 PROCEDURE — 99284 EMERGENCY DEPT VISIT MOD MDM: CPT | Mod: 25

## 2020-02-10 PROCEDURE — 87086 URINE CULTURE/COLONY COUNT: CPT

## 2020-02-10 PROCEDURE — 85027 COMPLETE CBC AUTOMATED: CPT

## 2020-02-10 PROCEDURE — 93005 ELECTROCARDIOGRAM TRACING: CPT | Mod: 76

## 2020-02-10 PROCEDURE — 99284 EMERGENCY DEPT VISIT MOD MDM: CPT | Mod: GC

## 2020-02-10 PROCEDURE — 87186 SC STD MICRODIL/AGAR DIL: CPT

## 2020-02-10 PROCEDURE — 84484 ASSAY OF TROPONIN QUANT: CPT

## 2020-02-10 PROCEDURE — 81001 URINALYSIS AUTO W/SCOPE: CPT

## 2020-02-10 RX ORDER — DIGOXIN 250 MCG
0.25 TABLET ORAL ONCE
Refills: 0 | Status: COMPLETED | OUTPATIENT
Start: 2020-02-10 | End: 2020-02-10

## 2020-02-10 RX ORDER — DILTIAZEM HCL 120 MG
240 CAPSULE, EXT RELEASE 24 HR ORAL ONCE
Refills: 0 | Status: COMPLETED | OUTPATIENT
Start: 2020-02-10 | End: 2020-02-10

## 2020-02-10 RX ORDER — DABIGATRAN ETEXILATE MESYLATE 150 MG/1
150 CAPSULE ORAL ONCE
Refills: 0 | Status: COMPLETED | OUTPATIENT
Start: 2020-02-10 | End: 2020-02-10

## 2020-02-10 RX ORDER — CEFTRIAXONE 500 MG/1
1000 INJECTION, POWDER, FOR SOLUTION INTRAMUSCULAR; INTRAVENOUS ONCE
Refills: 0 | Status: COMPLETED | OUTPATIENT
Start: 2020-02-10 | End: 2020-02-10

## 2020-02-10 RX ORDER — CEPHALEXIN 500 MG
1 CAPSULE ORAL
Qty: 21 | Refills: 0
Start: 2020-02-10 | End: 2020-02-16

## 2020-02-10 RX ADMIN — Medication 0.25 MILLIGRAM(S): at 12:12

## 2020-02-10 RX ADMIN — Medication 240 MILLIGRAM(S): at 12:12

## 2020-02-10 RX ADMIN — CEFTRIAXONE 100 MILLIGRAM(S): 500 INJECTION, POWDER, FOR SOLUTION INTRAMUSCULAR; INTRAVENOUS at 14:58

## 2020-02-10 RX ADMIN — DABIGATRAN ETEXILATE MESYLATE 150 MILLIGRAM(S): 150 CAPSULE ORAL at 12:16

## 2020-02-10 NOTE — CONSULT NOTE ADULT - ATTENDING COMMENTS
Patient seen and examined, agree with above assessment and plan as transcribed above.    - HR is likely driven by UTI  - Abx per ER  - Cont higher dose of dig  - F/U with Dr Luna in 2 day as planned    Robi Sheldon MD, [facility]  BEEPER [phone]

## 2020-02-10 NOTE — ED PROVIDER NOTE - PROGRESS NOTE DETAILS
Yamil Guerra, PGY-2: patient states that she is feeling better, given antibiotics for UTI, seen by Dr. Sheldon (cardiology) in ED, has appointment on wednesday with Dr. Luna her cardiologist.

## 2020-02-10 NOTE — CONSULT NOTE ADULT - SUBJECTIVE AND OBJECTIVE BOX
HISTORY OF PRESENT ILLNESS: HPI:    As you know, she is a pleasant 87 year old female with a past medical history of hypertension, hyperlipidemia, chronic atrial fibrillation, history of CVA in 2011, on lifelong anticoagulation, recent NST with no ischemia 9/2019, and recent TTE 12/2019 with Normal LV function and atleast moderate AS, presenting with palpitations since last week.  SHe saw Dr Luna Friday and had her Digoxin dose increased.  Here she is found with a UTI and started on antibiotics.  Once she received her home dose of meds here, her BP and HR were stable.  SHe denies CP, SOB, near syncope or syncope.      PAST MEDICAL & SURGICAL HISTORY:  Lumbar stenosis  CVA (cerebral vascular accident)  GERD (gastroesophageal reflux disease)  Atrial fibrillation  TIA (Transient Ischemic Attack)  Tremor, Essential  Hyperlipidemia  HTN (Hypertension)  S/P left cataract extraction  History of D&C  S/P rotator cuff repair    MEDICATIONS  (STANDING):  Pradaxa 150 mg capsule 1 CAPSULE BID  omeprazole 20 mg capsule,delayed release 1 CAPSULE DAILY  sertraline 100 mg tablet 1 TABLET QHS  diltiazem  mg capsule,extended release 24 hr-1 TABLET DAILY  Digoxin .25mg daily  atorvastatin 40 mg tablet-1 TABLET DAILY    Allergies  No Known Allergies    FAMILY HISTORY:  No pertinent family history in first degree relatives  Noncontributory for premature coronary disease or sudden cardiac death    SOCIAL HISTORY:    [x ] Non-smoker  [ ] Smoker  [ ] Alcohol    FLU VACCINE THIS YEAR STARTS IN AUGUST:  [ ] Yes    [ ] No    IF OVER 65 HAVE YOU EVER HAD A PNA VACCINE:  [ ] Yes    [ ] No       [ ] N/A      REVIEW OF SYSTEMS:  [ ]chest pain  [  ]shortness of breath  [ x ]palpitations  [  ]syncope  [ ]near syncope [ ]upper extremity weakness   [ ] lower extremity weakness  [  ]diplopia  [  ]altered mental status   [ ]fevers  [ ]chills [ ]nausea  [ ]vomiting  [  ]dysphagia    [ ]abdominal pain  [ ]melena  [ ]BRBPR    [ ]epistaxis  [  ]rash    [ ]lower extremity edema      [x ] All others negative	  [ ] Unable to obtain      LABS:	 	                        12.7   10.71 )-----------( 264      ( 10 Feb 2020 11:30 )             39.3     141  |  104  |  17  ----------------------------<  111<H>  3.9   |  21<L>  |  0.83    Ca    9.8      10 Feb 2020 11:30    TPro  7.5  /  Alb  4.1  /  TBili  0.6  /  DBili  x   /  AST  19  /  ALT  23  /  AlkPhos  101  02-10      PHYSICAL EXAM:  T(C): 36.8 (02-10-20 @ 10:45), Max: 36.8 (02-10-20 @ 10:45)  HR: 71 (02-10-20 @ 14:27) (71 - 109)  BP: 138/74 (02-10-20 @ 14:27) (128/82 - 179/101)  RR: 21 (02-10-20 @ 14:27) (18 - 21)  SpO2: 98% (02-10-20 @ 14:27) (97% - 100%)  Wt(kg): --   BMI (kg/m2): 26.4 (02-10-20 @ 10:49)    Gen: Appears well in NAD  HEENT:  (-)icterus (-)pallor  CV: N S1 S2 2/6 NAZ (+)2 Pulses B/l  Resp:  Clear to auscultation B/L, normal effort  GI: (+) BS Soft, NT, ND  Lymph:  (-)Edema, (-)obvious lymphadenopathy  Skin: Warm to touch, Normal turgor  Psych: Appropriate mood and affect     ECG: AF 100s	    ASSESSMENT/PLAN: 	As you know, she is a pleasant 87 year old female with a past medical history of hypertension, hyperlipidemia, chronic atrial fibrillation, history of CVA in 2011, on lifelong anticoagulation, recent NST with no ischemia 9/2019, and recent TTE 12/2019 with Normal LV function and atleast moderate AS, presenting with palpitations since last week and was in rapid AF Friday.    --suspect elevated HR in the setting of infection  --cont on current meds as listed  --Abx per ER  --not in clinical CHF  --f/u Dr Luna as scheduled wed Feb 12 at 12PM

## 2020-02-10 NOTE — ED PROVIDER NOTE - OBJECTIVE STATEMENT
87y female with PMH of afib on pradaxa, CVA with residual right sided weakness, HTN, HLD presenting with palpitations. Started 3 days ago, feels her heart beating in her chest are intermittent, no associated SOB, chest pain, nausea, vomiting, lightheadedness, vision changes, went to her cardiologist who increased her digoxin. Symptoms have not changed but came because they have persisted. Home afib meds: digoxin, pradaxa, diltiazem.

## 2020-02-10 NOTE — ED ADULT NURSE NOTE - OBJECTIVE STATEMENT
pt comes in form home BIBA for palpations 1 week now.  Pt has hx of A fib heartrate 100-110 and A fib on the monitor.  No chest pain with the palpations.  Pt has left sided neck pain secondary to her arthritis ans finished a prednisone pack recently.  pt has cva and right sided weakness uses a walker a home and private aide and  is at bedside IVL placed and bloods drawn and held and pending md to evaluate Mpuleorn

## 2020-02-10 NOTE — ED PROVIDER NOTE - NSFOLLOWUPINSTRUCTIONS_ED_ALL_ED_FT
You were diagnosed with a urinary tract infection.     Take all medications as described. You have been prescribed an antibiotic for your urinary tract infection, Keflex 500mg 3 times a day for 7 days.    Follow up with Dr. Luna at your already scheduled appointment on 02/12/2020

## 2020-02-10 NOTE — ED PROVIDER NOTE - CLINICAL SUMMARY MEDICAL DECISION MAKING FREE TEXT BOX
87y female with afib presenting with palpitations, no other associated symptoms. Has not taken her home medications will give. Concerning for acs, infection. Will get cxr, ekg, cbc, cmp, trop, ua.

## 2020-02-10 NOTE — ED PROVIDER NOTE - ATTENDING CONTRIBUTION TO CARE
I, James Gamboa, performed a history and physical exam of the patient and discussed their management with the resident and /or advanced care provider. I reviewed the resident and /or ACP's note and agree with the documented findings and plan of care. I was present and available for all procedures.  Patient has medications adjusted as outpatient for known a.fib and is rate controlled in the ED.  Patient found to have UTI which will be treated.  Discussed patient with her cardiology team who is able to see patient while she is in the ED.  Patient stabe for outpatient f/u.

## 2020-02-10 NOTE — ED PROVIDER NOTE - CARE PLAN
Principal Discharge DX:	UTI (urinary tract infection)  Secondary Diagnosis:	Atrial fibrillation, unspecified type

## 2020-02-10 NOTE — ED ADULT NURSE NOTE - NSIMPLEMENTINTERV_GEN_ALL_ED
Implemented All Fall with Harm Risk Interventions:  [place] to call system. Call bell, personal items and telephone within reach. Instruct patient to call for assistance. Room bathroom lighting operational. Non-slip footwear when patient is off stretcher. Physically safe environment: no spills, clutter or unnecessary equipment. Stretcher in lowest position, wheels locked, appropriate side rails in place. Provide visual cue, wrist band, yellow gown, etc. Monitor gait and stability. Monitor for mental status changes and reorient to person, place, and time. Review medications for side effects contributing to fall risk. Reinforce activity limits and safety measures with patient and family. Provide visual clues: red socks.

## 2020-02-10 NOTE — ED ADULT NURSE NOTE - CHPI ED NUR SYMPTOMS NEG
no congestion/no fever/no nausea/no diaphoresis/no back pain/no chest pain/no chills/no shortness of breath

## 2020-02-10 NOTE — ED PROVIDER NOTE - PATIENT PORTAL LINK FT
You can access the FollowMyHealth Patient Portal offered by [facility] by registering at the following website: [email or web address]. By joining [employer]’s FollowMyHealth portal, you will also be able to view your health information using other applications (apps) compatible with our system.

## 2020-02-12 RX ORDER — AZTREONAM 2 G
1 VIAL (EA) INJECTION
Qty: 14 | Refills: 0
Start: 2020-02-12 | End: 2020-02-18

## 2020-02-12 NOTE — ED POST DISCHARGE NOTE - DETAILS
2/12/19 - [facility] for 1522.  Debbie 2/12/19 - [facility] for 1522.  Debbie.  Pt called back feeling better, explained test results and need to change antibiotics and stop keflex.  Debbie

## 2020-09-21 ENCOUNTER — LABORATORY RESULT (OUTPATIENT)
Age: 85
End: 2020-09-21

## 2020-09-21 ENCOUNTER — NON-APPOINTMENT (OUTPATIENT)
Age: 85
End: 2020-09-21

## 2020-09-21 ENCOUNTER — APPOINTMENT (OUTPATIENT)
Dept: INTERNAL MEDICINE | Facility: CLINIC | Age: 85
End: 2020-09-21
Payer: MEDICARE

## 2020-09-21 VITALS — HEIGHT: 59 IN | TEMPERATURE: 97.7 F | BODY MASS INDEX: 27.21 KG/M2 | WEIGHT: 135 LBS

## 2020-09-21 VITALS — SYSTOLIC BLOOD PRESSURE: 118 MMHG | DIASTOLIC BLOOD PRESSURE: 78 MMHG | HEART RATE: 72 BPM | RESPIRATION RATE: 14 BRPM

## 2020-09-21 DIAGNOSIS — Z11.59 ENCOUNTER FOR SCREENING FOR OTHER VIRAL DISEASES: ICD-10-CM

## 2020-09-21 PROCEDURE — 99214 OFFICE O/P EST MOD 30 MIN: CPT | Mod: 25

## 2020-09-21 PROCEDURE — 93000 ELECTROCARDIOGRAM COMPLETE: CPT | Mod: 59

## 2020-09-21 PROCEDURE — G0008: CPT

## 2020-09-21 PROCEDURE — 90662 IIV NO PRSV INCREASED AG IM: CPT

## 2020-09-21 PROCEDURE — G0439: CPT

## 2020-09-21 PROCEDURE — 36415 COLL VENOUS BLD VENIPUNCTURE: CPT

## 2020-09-21 PROCEDURE — 99497 ADVNCD CARE PLAN 30 MIN: CPT | Mod: 33

## 2020-09-21 PROCEDURE — G0444 DEPRESSION SCREEN ANNUAL: CPT | Mod: 59

## 2020-09-21 RX ORDER — ATORVASTATIN CALCIUM 40 MG/1
40 TABLET, FILM COATED ORAL
Qty: 90 | Refills: 0 | Status: DISCONTINUED | COMMUNITY
Start: 2018-09-26 | End: 2020-09-21

## 2020-09-21 NOTE — HISTORY OF PRESENT ILLNESS
[PMH Reviewed and Updated] : past medical history reviewed and updated [PSH Reviewed and Updated] : past surgical history reviewed and updated [Family History Reviewed and Updated] : family history reviewed and updated [Medication and Allergies Reconciled] : medication and allergies reconciled [0] : 0 [Retired] : retired from work [None] : The patient has no concerns about alcohol abuse [Never] : has never used illicit drugs [Compliant with medications] : compliant with medications [Spouse] : spouse [Friends] : friends [Children] : children [Needs some help with ADLs] : need some help with ADLs [Does not drive] : does not drive [No history of falls] : no history of falls [Seatbelts] : seatbelts [Smoke Detectors] : smoke detectors [Carbon Monoxide Detector] : carbon monoxide detector [Bathroom Grab Bars] : bathroom grab bars [Fall Prevention Measures] : fall prevention measures [Sunscreen] : sunscreen [Advanced Directives Discussed] : discussed at today's visit [Over the Past 2 Weeks, Have You Felt Down, Depressed, or Hopeless?] : 1.) Over the past 2 weeks, have you felt down, depressed, or hopeless? No [Over the Past 2 Weeks, Have You Felt Little Interest or Pleasure Doing Things?] : 2.) Over the past 2 weeks, have you felt little interest or pleasure doing things? No [FreeTextEntry1] : Pt presents to establish care, as well as for management of her chronic medical conditions including Afib, HTN, hyperlipidemia, Asthma, s/p stroke.\par  taking zoloft - psychiatrist has retired\par She is without acute complaints\par \par Follows with cardiology regularly.\par Due for echo in Dec\par \par Saw Derm last week - nothing removed

## 2020-09-21 NOTE — PHYSICAL EXAM
[Sclera] : the sclera and conjunctiva were normal [PERRL With Normal Accommodation] : pupils were equal in size, round, and reactive to light [Extraocular Movements] : extraocular movements were intact [Outer Ear] : the ears and nose were normal in appearance [Oropharynx] : the oropharynx was normal [Neck Appearance] : the appearance of the neck was normal [Neck Cervical Mass (___cm)] : no neck mass was observed [Jugular Venous Distention Increased] : there was no jugular-venous distention [Thyroid Diffuse Enlargement] : the thyroid was not enlarged [Thyroid Nodule] : there were no palpable thyroid nodules [Auscultation Breath Sounds / Voice Sounds] : lungs were clear to auscultation bilaterally [Heart Rate And Rhythm] : heart rate was normal and rhythm regular [Heart Sounds] : normal S1 and S2 [Heart Sounds Gallop] : no gallops [Murmurs] : no murmurs [Heart Sounds Pericardial Friction Rub] : no pericardial rub [Edema] : there was no peripheral edema [Bowel Sounds] : normal bowel sounds [Abdomen Soft] : soft [Abdomen Tenderness] : non-tender [Abdomen Mass (___ Cm)] : no abdominal mass palpated [Cervical Lymph Nodes Enlarged Posterior Bilaterally] : posterior cervical [Cervical Lymph Nodes Enlarged Anterior Bilaterally] : anterior cervical [Supraclavicular Lymph Nodes Enlarged Bilaterally] : supraclavicular [No CVA Tenderness] : no ~M costovertebral angle tenderness [No Spinal Tenderness] : no spinal tenderness [Abnormal Walk] : normal gait [Nail Clubbing] : no clubbing  or cyanosis of the fingernails [Musculoskeletal - Swelling] : no joint swelling seen [Motor Tone] : muscle strength and tone were normal [Skin Color & Pigmentation] : normal skin color and pigmentation [Skin Turgor] : normal skin turgor [] : no rash [Deep Tendon Reflexes (DTR)] : deep tendon reflexes were 2+ and symmetric [Sensation] : the sensory exam was normal to light touch and pinprick [No Focal Deficits] : no focal deficits [Oriented To Time, Place, And Person] : oriented to person, place, and time [Impaired Insight] : insight and judgment were intact [Affect] : the affect was normal [FreeTextEntry1] : tremor

## 2020-09-21 NOTE — HEALTH RISK ASSESSMENT
[Very Good] : ~his/her~  mood as very good [No falls in past year] : Patient reported no falls in the past year [0] : 2) Feeling down, depressed, or hopeless: Not at all (0) [HIV test declined] : HIV test declined [Hepatitis C test declined] : Hepatitis C test declined [None] : None [With Significant Other] : lives with significant other [] :  [Feels Safe at Home] : Feels safe at home [Fully functional (bathing, dressing, toileting, transferring, walking, feeding)] : Fully functional (bathing, dressing, toileting, transferring, walking, feeding) [With Patient/Caregiver] : With Patient/Caregiver [Name: ___] : Health Care Proxy's Name: [unfilled]  [DNR] : DNR [Discussed at today's visit] : Advance Directives Discussed at today's visit [Relationship: ___] : Relationship: [unfilled] [] : No [Change in mental status noted] : No change in mental status noted [Reports changes in hearing] : Reports no changes in hearing [Reports changes in vision] : Reports no changes in vision [Reports changes in dental health] : Reports no changes in dental health [de-identified] : hearing aides [AdvancecareDate] : 09/20 [FreeTextEntry4] : 16 minutes spent discussing HCP/ACP - she will send a copy ot the office for my review.

## 2020-09-21 NOTE — ASSESSMENT
[FreeTextEntry1] : Blood work was drawn and sent to the lab today. The patient has been instructed to call the office next week to discuss today's lab work.\par \par Obtain all records from cardiology\par \par Continue all meds\par \par 16 minutes spent with patient discussing ACP/HCP. She has designated a proxy, and the proxy is aware of the patients wishes and will comply.\par \par Flu vaccine given\par \par f/u with Cardiology per routine\par Neuro f/u per routine\par \par Routine health counselling provided.\par F/U six months\par \par Annual CC

## 2020-09-30 DIAGNOSIS — N39.0 URINARY TRACT INFECTION, SITE NOT SPECIFIED: ICD-10-CM

## 2020-09-30 LAB
25(OH)D3 SERPL-MCNC: 44.8 NG/ML
ALBUMIN SERPL ELPH-MCNC: 4.6 G/DL
ALP BLD-CCNC: 100 U/L
ALT SERPL-CCNC: 33 U/L
ANION GAP SERPL CALC-SCNC: 14 MMOL/L
APPEARANCE: ABNORMAL
AST SERPL-CCNC: 34 U/L
BASOPHILS # BLD AUTO: 0.04 K/UL
BASOPHILS NFR BLD AUTO: 0.5 %
BILIRUB SERPL-MCNC: 0.4 MG/DL
BILIRUBIN URINE: NEGATIVE
BLOOD URINE: NEGATIVE
BUN SERPL-MCNC: 28 MG/DL
CALCIUM SERPL-MCNC: 9.6 MG/DL
CHLORIDE SERPL-SCNC: 106 MMOL/L
CHOLEST SERPL-MCNC: 154 MG/DL
CHOLEST/HDLC SERPL: 3.5 RATIO
CO2 SERPL-SCNC: 20 MMOL/L
COLOR: YELLOW
CREAT SERPL-MCNC: 0.84 MG/DL
DIGOXIN SERPL-MCNC: 1.4 NG/ML
EOSINOPHIL # BLD AUTO: 0.07 K/UL
EOSINOPHIL NFR BLD AUTO: 0.9 %
FOLATE SERPL-MCNC: >20 NG/ML
GLUCOSE QUALITATIVE U: NEGATIVE
GLUCOSE SERPL-MCNC: 110 MG/DL
HCT VFR BLD CALC: 40.6 %
HDLC SERPL-MCNC: 44 MG/DL
HGB BLD-MCNC: 13.1 G/DL
IMM GRANULOCYTES NFR BLD AUTO: 0.3 %
KETONES URINE: NORMAL
LDLC SERPL CALC-MCNC: 81 MG/DL
LEUKOCYTE ESTERASE URINE: ABNORMAL
LYMPHOCYTES # BLD AUTO: 2.42 K/UL
LYMPHOCYTES NFR BLD AUTO: 32.7 %
MAGNESIUM SERPL-MCNC: 2.2 MG/DL
MAN DIFF?: NORMAL
MCHC RBC-ENTMCNC: 30.5 PG
MCHC RBC-ENTMCNC: 32.3 GM/DL
MCV RBC AUTO: 94.4 FL
MONOCYTES # BLD AUTO: 0.79 K/UL
MONOCYTES NFR BLD AUTO: 10.7 %
NEUTROPHILS # BLD AUTO: 4.06 K/UL
NEUTROPHILS NFR BLD AUTO: 54.9 %
NITRITE URINE: NEGATIVE
PH URINE: 5.5
PLATELET # BLD AUTO: 292 K/UL
POTASSIUM SERPL-SCNC: 4.5 MMOL/L
PROT SERPL-MCNC: 6.9 G/DL
PROTEIN URINE: NORMAL
RBC # BLD: 4.3 M/UL
RBC # FLD: 13.1 %
SARS-COV-2 IGG SERPL IA-ACNC: 0.09 INDEX
SARS-COV-2 IGG SERPL QL IA: NEGATIVE
SODIUM SERPL-SCNC: 141 MMOL/L
SPECIFIC GRAVITY URINE: 1.03
T4 FREE SERPL-MCNC: 0.9 NG/DL
T4 SERPL-MCNC: 5.5 UG/DL
TRIGL SERPL-MCNC: 145 MG/DL
TSH SERPL-ACNC: 3.97 UIU/ML
UROBILINOGEN URINE: NORMAL
VIT B12 SERPL-MCNC: 1492 PG/ML
WBC # FLD AUTO: 7.4 K/UL

## 2020-11-06 ENCOUNTER — LABORATORY RESULT (OUTPATIENT)
Age: 85
End: 2020-11-06

## 2020-11-06 ENCOUNTER — APPOINTMENT (OUTPATIENT)
Dept: INTERNAL MEDICINE | Facility: CLINIC | Age: 85
End: 2020-11-06
Payer: MEDICARE

## 2020-11-06 VITALS — HEART RATE: 72 BPM | DIASTOLIC BLOOD PRESSURE: 76 MMHG | SYSTOLIC BLOOD PRESSURE: 112 MMHG | RESPIRATION RATE: 14 BRPM

## 2020-11-06 VITALS — WEIGHT: 135 LBS | BODY MASS INDEX: 27.21 KG/M2 | HEIGHT: 59 IN | TEMPERATURE: 98.2 F

## 2020-11-06 DIAGNOSIS — R35.0 FREQUENCY OF MICTURITION: ICD-10-CM

## 2020-11-06 PROCEDURE — 99213 OFFICE O/P EST LOW 20 MIN: CPT | Mod: 25

## 2020-11-06 RX ORDER — SULFAMETHOXAZOLE AND TRIMETHOPRIM 800; 160 MG/1; MG/1
800-160 TABLET ORAL TWICE DAILY
Qty: 14 | Refills: 0 | Status: DISCONTINUED | COMMUNITY
Start: 2020-09-30 | End: 2020-11-06

## 2020-11-06 NOTE — ASSESSMENT
[FreeTextEntry1] : Check UA and Urine cx\par May need additional antibiotic pending above and sensitivities\par \par If above unrevealing consider Urology evaluation

## 2020-11-06 NOTE — HISTORY OF PRESENT ILLNESS
[FreeTextEntry8] : Pt presents for evaluation - has been having increased urinary frequency, with incontinence at night.\par Pt denies dysuria/hematuria.\par No fever/chills

## 2020-11-09 ENCOUNTER — NON-APPOINTMENT (OUTPATIENT)
Age: 85
End: 2020-11-09

## 2020-11-09 LAB
APPEARANCE: CLEAR
BILIRUBIN URINE: NEGATIVE
BLOOD URINE: NEGATIVE
COLOR: YELLOW
GLUCOSE QUALITATIVE U: NEGATIVE
KETONES URINE: NEGATIVE
LEUKOCYTE ESTERASE URINE: ABNORMAL
NITRITE URINE: NEGATIVE
PH URINE: 5.5
PROTEIN URINE: NEGATIVE
SPECIFIC GRAVITY URINE: 1.02
UROBILINOGEN URINE: NORMAL

## 2020-11-11 LAB — BACTERIA UR CULT: NORMAL

## 2020-12-23 PROBLEM — N39.0 ACUTE UTI: Status: RESOLVED | Noted: 2020-09-30 | Resolved: 2020-12-23

## 2021-01-12 ENCOUNTER — NON-APPOINTMENT (OUTPATIENT)
Age: 86
End: 2021-01-12

## 2021-01-12 ENCOUNTER — APPOINTMENT (OUTPATIENT)
Dept: OPHTHALMOLOGY | Facility: CLINIC | Age: 86
End: 2021-01-12
Payer: MEDICARE

## 2021-01-12 PROCEDURE — 92014 COMPRE OPH EXAM EST PT 1/>: CPT

## 2021-01-12 PROCEDURE — 92202 OPSCPY EXTND ON/MAC DRAW: CPT

## 2021-03-31 ENCOUNTER — APPOINTMENT (OUTPATIENT)
Dept: INTERNAL MEDICINE | Facility: CLINIC | Age: 86
End: 2021-03-31
Payer: MEDICARE

## 2021-03-31 ENCOUNTER — LABORATORY RESULT (OUTPATIENT)
Age: 86
End: 2021-03-31

## 2021-03-31 VITALS — OXYGEN SATURATION: 98 % | HEART RATE: 83 BPM | TEMPERATURE: 97.3 F | HEIGHT: 59 IN

## 2021-03-31 VITALS — DIASTOLIC BLOOD PRESSURE: 76 MMHG | SYSTOLIC BLOOD PRESSURE: 110 MMHG | HEART RATE: 84 BPM | RESPIRATION RATE: 14 BRPM

## 2021-03-31 VITALS — WEIGHT: 140 LBS | BODY MASS INDEX: 28.28 KG/M2

## 2021-03-31 LAB
BILIRUB UR QL STRIP: NEGATIVE
CLARITY UR: NORMAL
COLLECTION METHOD: NORMAL
GLUCOSE UR-MCNC: NEGATIVE
HCG UR QL: 0.2 EU/DL
HGB UR QL STRIP.AUTO: NEGATIVE
KETONES UR-MCNC: NEGATIVE
LEUKOCYTE ESTERASE UR QL STRIP: ABNORMAL
NITRITE UR QL STRIP: NEGATIVE
PH UR STRIP: 5.5
PROT UR STRIP-MCNC: NEGATIVE
SP GR UR STRIP: 1.02

## 2021-03-31 PROCEDURE — 99213 OFFICE O/P EST LOW 20 MIN: CPT | Mod: 25

## 2021-03-31 PROCEDURE — 81003 URINALYSIS AUTO W/O SCOPE: CPT | Mod: QW

## 2021-03-31 PROCEDURE — 36415 COLL VENOUS BLD VENIPUNCTURE: CPT

## 2021-03-31 NOTE — PHYSICAL EXAM
[III] : a grade 3 [Normal] : soft, non-tender, non-distended, no masses palpated, no HSM and normal bowel sounds

## 2021-03-31 NOTE — HISTORY OF PRESENT ILLNESS
[FreeTextEntry8] : Pt presents for evaluation - not "feeling herself" since Saturday.\par Fatigued\par no fever/chills.\par No abdominal pain. No dysuria/hematuria.\par No CP/SOB/WINSTON/palpitations.\par No HA/dizziness/N/V.\par \par Has one bad day, then one good day.\par SHe felt badly yesterday, but better today.

## 2021-03-31 NOTE — ASSESSMENT
[FreeTextEntry1] : Blood work was drawn and sent to the lab today. The patient has been instructed to call the office next week to discuss today's lab work.\par \par Begin bactrim for positive UA\par Await UCx\par \par Hydration stressed to patient.

## 2021-04-02 LAB
ALBUMIN SERPL ELPH-MCNC: 4.5 G/DL
ALP BLD-CCNC: 104 U/L
ALT SERPL-CCNC: 33 U/L
ANION GAP SERPL CALC-SCNC: 18 MMOL/L
APPEARANCE: ABNORMAL
AST SERPL-CCNC: 31 U/L
BASOPHILS # BLD AUTO: 0.07 K/UL
BASOPHILS NFR BLD AUTO: 0.8 %
BILIRUB SERPL-MCNC: 0.4 MG/DL
BILIRUBIN URINE: NEGATIVE
BLOOD URINE: NEGATIVE
BUN SERPL-MCNC: 15 MG/DL
CALCIUM SERPL-MCNC: 10.2 MG/DL
CHLORIDE SERPL-SCNC: 106 MMOL/L
CO2 SERPL-SCNC: 20 MMOL/L
COLOR: YELLOW
CREAT SERPL-MCNC: 1.06 MG/DL
EOSINOPHIL # BLD AUTO: 0.11 K/UL
EOSINOPHIL NFR BLD AUTO: 1.3 %
ERYTHROCYTE [SEDIMENTATION RATE] IN BLOOD BY WESTERGREN METHOD: 36 MM/HR
GLUCOSE QUALITATIVE U: NEGATIVE
GLUCOSE SERPL-MCNC: 88 MG/DL
HCT VFR BLD CALC: 42.2 %
HGB BLD-MCNC: 13.3 G/DL
IMM GRANULOCYTES NFR BLD AUTO: 0.5 %
KETONES URINE: NEGATIVE
LEUKOCYTE ESTERASE URINE: NEGATIVE
LYMPHOCYTES # BLD AUTO: 2.32 K/UL
LYMPHOCYTES NFR BLD AUTO: 27.6 %
MAN DIFF?: NORMAL
MCHC RBC-ENTMCNC: 29.8 PG
MCHC RBC-ENTMCNC: 31.5 GM/DL
MCV RBC AUTO: 94.4 FL
MONOCYTES # BLD AUTO: 0.85 K/UL
MONOCYTES NFR BLD AUTO: 10.1 %
NEUTROPHILS # BLD AUTO: 5.01 K/UL
NEUTROPHILS NFR BLD AUTO: 59.7 %
NITRITE URINE: NEGATIVE
PH URINE: 5.5
PLATELET # BLD AUTO: 353 K/UL
POTASSIUM SERPL-SCNC: 4.5 MMOL/L
PROT SERPL-MCNC: 7.1 G/DL
PROTEIN URINE: NORMAL
RBC # BLD: 4.47 M/UL
RBC # FLD: 13.3 %
SODIUM SERPL-SCNC: 144 MMOL/L
SPECIFIC GRAVITY URINE: 1.02
TSH SERPL-ACNC: 4.8 UIU/ML
UROBILINOGEN URINE: NORMAL
WBC # FLD AUTO: 8.4 K/UL

## 2021-04-27 ENCOUNTER — APPOINTMENT (OUTPATIENT)
Dept: CARDIOTHORACIC SURGERY | Facility: CLINIC | Age: 86
End: 2021-04-27
Payer: MEDICARE

## 2021-04-27 ENCOUNTER — OUTPATIENT (OUTPATIENT)
Dept: OUTPATIENT SERVICES | Facility: HOSPITAL | Age: 86
LOS: 1 days | End: 2021-04-27
Payer: MEDICARE

## 2021-04-27 ENCOUNTER — NON-APPOINTMENT (OUTPATIENT)
Age: 86
End: 2021-04-27

## 2021-04-27 VITALS
DIASTOLIC BLOOD PRESSURE: 86 MMHG | TEMPERATURE: 98 F | HEART RATE: 90 BPM | WEIGHT: 137 LBS | BODY MASS INDEX: 27.62 KG/M2 | RESPIRATION RATE: 15 BRPM | HEIGHT: 59 IN | SYSTOLIC BLOOD PRESSURE: 148 MMHG | OXYGEN SATURATION: 99 %

## 2021-04-27 DIAGNOSIS — Z87.891 PERSONAL HISTORY OF NICOTINE DEPENDENCE: ICD-10-CM

## 2021-04-27 DIAGNOSIS — I35.0 NONRHEUMATIC AORTIC (VALVE) STENOSIS: ICD-10-CM

## 2021-04-27 DIAGNOSIS — I38 ENDOCARDITIS, VALVE UNSPECIFIED: ICD-10-CM

## 2021-04-27 DIAGNOSIS — Z98.89 OTHER SPECIFIED POSTPROCEDURAL STATES: Chronic | ICD-10-CM

## 2021-04-27 DIAGNOSIS — J45.909 UNSPECIFIED ASTHMA, UNCOMPLICATED: ICD-10-CM

## 2021-04-27 DIAGNOSIS — Z98.42 CATARACT EXTRACTION STATUS, LEFT EYE: Chronic | ICD-10-CM

## 2021-04-27 PROCEDURE — 93000 ELECTROCARDIOGRAM COMPLETE: CPT

## 2021-04-27 PROCEDURE — 93306 TTE W/DOPPLER COMPLETE: CPT

## 2021-04-27 PROCEDURE — 93306 TTE W/DOPPLER COMPLETE: CPT | Mod: 26

## 2021-04-27 PROCEDURE — 99211 OFF/OP EST MAY X REQ PHY/QHP: CPT

## 2021-04-27 PROCEDURE — 99204 OFFICE O/P NEW MOD 45 MIN: CPT

## 2021-04-27 RX ORDER — VIT A/VIT C/VIT E/ZINC/COPPER 4296-226
CAPSULE ORAL
Refills: 0 | Status: ACTIVE | COMMUNITY
Start: 2021-04-27

## 2021-04-27 RX ORDER — SULFAMETHOXAZOLE AND TRIMETHOPRIM 800; 160 MG/1; MG/1
800-160 TABLET ORAL TWICE DAILY
Qty: 14 | Refills: 0 | Status: COMPLETED | COMMUNITY
Start: 2021-03-31 | End: 2021-04-27

## 2021-04-27 RX ORDER — DABIGATRAN ETEXILATE MESYLATE 150 MG/1
150 CAPSULE ORAL TWICE DAILY
Qty: 180 | Refills: 1 | Status: COMPLETED | COMMUNITY
End: 2021-04-27

## 2021-04-27 NOTE — ASSESSMENT
[FreeTextEntry1] : Mrs. Glaser is an 89 year old female with past medical history that includes AFib, CVA in 2011 now on lifelong anticoagulation (Pradaxa), MIRA, anxiety, HLD and valvular heart disease. She was referred by Dr. Adrian Luna for evaluation of aortic stenosis. Echocardiogram on 01/14/2021 demonstrated mGr 31 mmHg, pGr 50 mmHg, AoV 3.5 LVEF 67%. She reports several months of increasing fatigue and exertional dyspnea (NYHA II). She denies angina, PND, orthopnea, dizziness, syncope, or LE edema.\par \par I had the pleasure of evaluating your patient,Ms. AMINA GLASER who is a 89 year F with heart failure symptoms of shortness of breath and fatigue (NYHA class II).\par \par  Ms. AMINA GLASER has moderate to severe AS with pG 50 mmHg, mG 31.3 mmHg, AoV 3.54 m/sec. She is a intermediate risk for surgical aortic valve replacement with a calculated STS score of 5.5%.  The risks and benefits of both SAVR and PADMINI have been explained and the patient would like to proceed with further workup and consideration for PADMINI by the structural heart team.  I explained the risks of vascular complication, pacemaker requirement and possible paravalvular leakage. She will require CT scan and catheterization before finalizing plans for the procedure. The patient was also made aware of the possibility of using conscious sedation or general anesthesia during the procedure.  Once completed, all imaging will be reviewed by the Heart Team and an optimal treatment strategy will be recommended.\par \par Plan:\par 1) Lab work scheduled for today CBC, CMP and Pro BNP\par 2) Structural CT scan without coronaries \par 3) Cardiac Catherization to evaluate coronary anatomy \par \par

## 2021-04-27 NOTE — HISTORY OF PRESENT ILLNESS
[FreeTextEntry1] : Mrs. Oh is an 89 year old female, referred by Dr. Adrian Luna for evaluation of aortic stenosis. She reports several months of increasing fatigue and exertional dyspnea. The dyspnea has worsened over the past week associated with bilateral ankle edema. She has no angina, PND, orthopnea, dizziness, or syncope. She and her  have a HHA at home for general assistance. \par \par Her past medical history includes AFIB, CVA in 2011 (now on lifelong anticoagulation, currently taking Pradaxa) with residual right sided weakness and weakened voice, MIRA, anxiety, and HLD. Her  states the CVA was presumed due to her AFIB. She has no history of CAD, MI, stents, or prior cardiac surgery. She has dyspnea with walking less than a block and some fatigue (NYHA II), requiring an afternoon nap. She has no angina or syncopal symptoms. Her Diltiazem was increased yesterday from 240mg to 300mg by Dr Luna. \par \par Repeat echocardiogram today demonstrates severe AS with an ZAC 0.7, peak and mean gradients of 74 and 44 mmHg, DVI 0.23, and an AoV of 4.3m/s. There is some variability of her hemodynamics and quantitation in the setting of AFIB.\par

## 2021-04-27 NOTE — REVIEW OF SYSTEMS
[Feeling Tired] : feeling tired [Loss Of Hearing] : hearing loss [Lower Ext Edema] : lower extremity edema [SOB on Exertion] : shortness of breath during exertion [Joint Pain] : joint pain [Joint Swelling] : joint swelling [Difficulty Walking] : difficulty walking [Anxiety] : anxiety [Negative] : Endocrine [Orthopnea] : no orthopnea [PND] : no PND [Dizziness] : no dizziness [FreeTextEntry8] : Urinary frequency

## 2021-04-27 NOTE — PHYSICAL EXAM
[General Appearance - Alert] : alert [General Appearance - In No Acute Distress] : in no acute distress [Sclera] : the sclera and conjunctiva were normal [PERRL With Normal Accommodation] : pupils were equal in size, round, and reactive to light [Extraocular Movements] : extraocular movements were intact [Outer Ear] : the ears and nose were normal in appearance [Oropharynx] : the oropharynx was normal [Jugular Venous Distention Increased] : there was no jugular-venous distention [Respiration, Rhythm And Depth] : normal respiratory rhythm and effort [Auscultation Breath Sounds / Voice Sounds] : lungs were clear to auscultation bilaterally [Irregularly Irregular] : irregularly irregular [II] : a grade 2 [III] : a grade 3 [Examination Of The Chest] : the chest was normal in appearance [Bowel Sounds] : normal bowel sounds [Abdomen Soft] : soft [Cervical Lymph Nodes Enlarged Posterior Bilaterally] : posterior cervical [Cervical Lymph Nodes Enlarged Anterior Bilaterally] : anterior cervical [No CVA Tenderness] : no ~M costovertebral angle tenderness [Oriented To Time, Place, And Person] : oriented to person, place, and time [Impaired Insight] : insight and judgment were intact [Right Carotid Bruit] : no bruit heard over the right carotid [Left Carotid Bruit] : no bruit heard over the left carotid [FreeTextEntry1] : deferred

## 2021-04-27 NOTE — CONSULT LETTER
[Dear  ___] : Dear ~ALEC, [Courtesy Letter:] : I had the pleasure of seeing your patient, [unfilled], in my office today. [Please see my note below.] : Please see my note below. [Consult Closing:] : Thank you very much for allowing me to participate in the care of this patient.  If you have any questions, please do not hesitate to contact me. [Sincerely,] : Sincerely, [FreeTextEntry2] : Tyrel Luna MD \par [address]\par [address]\[place]  [address] [FreeTextEntry3] : Arvind López MD\par \par Cardiovascular & Thoracic Surgery\par Professor\par [facility]\par \par

## 2021-04-27 NOTE — PHYSICAL EXAM
[Left Carotid Bruit] : no bruit heard over the left carotid [Right Carotid Bruit] : no bruit heard over the right carotid [FreeTextEntry1] : RLE weakness, ambulates with rollator

## 2021-04-27 NOTE — DISCUSSION/SUMMARY
[FreeTextEntry1] : Ms Oh has severe aortic stenosis with recent progression of NYHA II symptoms (fatigue and WINSTON). Dr López and I are in agreement that she is an appropriate candidate for PADMINI. As such, she will be scheduled for an angiogram (with one of Dr Luna's IC colleagues) and a cardiac structural CT. I advised that she would need to hold Pradaxa for 2 days prior to any invasive procedures (i.e. angiogram and PADMINI). Given her history of a stroke, I recommend we screen her CT for Rohwer (cerebral embolic protection), and she is agreeable with this plan. I have discussed the potential risks and benefits of PADMINI with her and her  in detail, including but not limited to stroke, the possible need for a pacemaker, bleeding, PVL, infection, and vascular complications. Once completed, all imaging will be reviewed by the Heart Team and an optimal treatment strategy recommended.

## 2021-04-27 NOTE — REASON FOR VISIT
[Initial Evaluation] : an initial evaluation [Spouse] : spouse [FreeTextEntry1] : valvular heart disease

## 2021-04-27 NOTE — HISTORY OF PRESENT ILLNESS
[FreeTextEntry1] : Mrs. Oh is an 89 year old female with past medical history that includes AFib, CVA in 2011 (residual difficulty speaking and right sided weakness, she uses a rollator during ambulation), essential tremor now on lifelong anticoagulation (Pradaxa), anxiety/depression, HLD and valvular heart disease. She was referred by Dr. Adrian Luna for evaluation of aortic stenosis. She reports several months of increasing fatigue and exertional dyspnea (NYHA II). She denies angina, PND, orthopnea, dizziness, syncope, or LE edema. Last Monday she presented to her cardiologist's office with acute onset pedal edema and shortness of breath had developed one week prior. She was instructed to make an appointment with Dr. Todd for evaluation for PADMINI. She presents with her spouse today. Home health aide helps with dressing and bathing

## 2021-04-27 NOTE — REVIEW OF SYSTEMS
[Fever] : no fever [Chills] : no chills [Abdominal Pain] : no abdominal pain [Nausea] : no nausea [Change in Appetite] : no change in appetite [Joint Pain] : no joint pain [Joint Swelling] : no joint swelling [Confusion] : no confusion was observed [Anxiety] : no anxiety

## 2021-05-05 DIAGNOSIS — Z01.818 ENCOUNTER FOR OTHER PREPROCEDURAL EXAMINATION: ICD-10-CM

## 2021-05-07 ENCOUNTER — APPOINTMENT (OUTPATIENT)
Dept: DISASTER EMERGENCY | Facility: CLINIC | Age: 86
End: 2021-05-07

## 2021-05-08 LAB — SARS-COV-2 N GENE NPH QL NAA+PROBE: NOT DETECTED

## 2021-05-10 ENCOUNTER — OUTPATIENT (OUTPATIENT)
Dept: OUTPATIENT SERVICES | Facility: HOSPITAL | Age: 86
LOS: 1 days | Discharge: HOME | End: 2021-05-10
Payer: MEDICARE

## 2021-05-10 DIAGNOSIS — Z98.890 OTHER SPECIFIED POSTPROCEDURAL STATES: Chronic | ICD-10-CM

## 2021-05-10 DIAGNOSIS — Z98.42 CATARACT EXTRACTION STATUS, LEFT EYE: Chronic | ICD-10-CM

## 2021-05-10 DIAGNOSIS — Z98.89 OTHER SPECIFIED POSTPROCEDURAL STATES: Chronic | ICD-10-CM

## 2021-05-10 DIAGNOSIS — Z96.651 PRESENCE OF RIGHT ARTIFICIAL KNEE JOINT: Chronic | ICD-10-CM

## 2021-05-10 DIAGNOSIS — Z90.89 ACQUIRED ABSENCE OF OTHER ORGANS: Chronic | ICD-10-CM

## 2021-05-10 LAB
A1C WITH ESTIMATED AVERAGE GLUCOSE RESULT: 5.7 % — HIGH (ref 4–5.6)
ANION GAP SERPL CALC-SCNC: 14 MMOL/L — SIGNIFICANT CHANGE UP (ref 7–14)
BUN SERPL-MCNC: 24 MG/DL — HIGH (ref 7–23)
CALCIUM SERPL-MCNC: 9.3 MG/DL — SIGNIFICANT CHANGE UP (ref 8.4–10.5)
CHLORIDE SERPL-SCNC: 108 MMOL/L — HIGH (ref 98–107)
CO2 SERPL-SCNC: 21 MMOL/L — LOW (ref 22–31)
CREAT SERPL-MCNC: 0.87 MG/DL — SIGNIFICANT CHANGE UP (ref 0.5–1.3)
ESTIMATED AVERAGE GLUCOSE: 117 MG/DL — HIGH (ref 68–114)
GLUCOSE SERPL-MCNC: 111 MG/DL — HIGH (ref 70–99)
HCT VFR BLD CALC: 39.5 % — SIGNIFICANT CHANGE UP (ref 34.5–45)
HGB BLD-MCNC: 12.8 G/DL — SIGNIFICANT CHANGE UP (ref 11.5–15.5)
MCHC RBC-ENTMCNC: 29.6 PG — SIGNIFICANT CHANGE UP (ref 27–34)
MCHC RBC-ENTMCNC: 32.4 GM/DL — SIGNIFICANT CHANGE UP (ref 32–36)
MCV RBC AUTO: 91.2 FL — SIGNIFICANT CHANGE UP (ref 80–100)
NRBC # BLD: 0 /100 WBCS — SIGNIFICANT CHANGE UP
NRBC # FLD: 0 K/UL — SIGNIFICANT CHANGE UP
PLATELET # BLD AUTO: 293 K/UL — SIGNIFICANT CHANGE UP (ref 150–400)
POTASSIUM SERPL-MCNC: 4.2 MMOL/L — SIGNIFICANT CHANGE UP (ref 3.5–5.3)
POTASSIUM SERPL-SCNC: 4.2 MMOL/L — SIGNIFICANT CHANGE UP (ref 3.5–5.3)
RBC # BLD: 4.33 M/UL — SIGNIFICANT CHANGE UP (ref 3.8–5.2)
RBC # FLD: 13.8 % — SIGNIFICANT CHANGE UP (ref 10.3–14.5)
SODIUM SERPL-SCNC: 143 MMOL/L — SIGNIFICANT CHANGE UP (ref 135–145)
WBC # BLD: 7.03 K/UL — SIGNIFICANT CHANGE UP (ref 3.8–10.5)
WBC # FLD AUTO: 7.03 K/UL — SIGNIFICANT CHANGE UP (ref 3.8–10.5)

## 2021-05-10 PROCEDURE — 93010 ELECTROCARDIOGRAM REPORT: CPT

## 2021-05-10 RX ORDER — SODIUM CHLORIDE 9 MG/ML
3 INJECTION INTRAMUSCULAR; INTRAVENOUS; SUBCUTANEOUS EVERY 8 HOURS
Refills: 0 | Status: DISCONTINUED | OUTPATIENT
Start: 2021-05-10 | End: 2021-05-24

## 2021-05-10 NOTE — H&P CARDIOLOGY - PSH
H/O arthroscopy  joint replacement b/l 1MCP b/l  H/O total knee replacement, right    History of D&C    History of tonsillectomy    S/P left cataract extraction    S/P rotator cuff repair

## 2021-05-10 NOTE — CHART NOTE - NSCHARTNOTEFT_GEN_A_CORE
The patient was noted to have elevated HgbA1c. The patient was made aware. The patient was recommended to f/u with PMD for further treatment.

## 2021-05-10 NOTE — H&P CARDIOLOGY - HISTORY OF PRESENT ILLNESS
89 y.o. female presents today for elective cardiac catheterization. The patient c/o SOB with exertion (walking a few feet), resolve with rest, started a few month ago, getting progressively worse. The patient denies chest pain, palpitations, dizziness, presyncope, syncope,  headache, visual disturbances, PE, DVT, MIRA, abdominal pain, N/V/D/C, hematochezia, melena, dysuria, hematuria, fever, chills. The patient was evaluated by her cardiologist, had Echo, showed severe AS. Due to patient's symptoms, and abnormal non invasive tests, the patient was recommended  to have cardiac catheterization. The patient denies any complaints at present.         Echo 4/2021  1. Calcified trileaflet aortic valve with decreased  opening. Peak transaortic valve gradient equals 74 mm Hg,  mean transaortic valve gradient equals 44 mm Hg, estimated  aortic valve area equals 0.7 sqcm (by continuity equation),  aortic valve velocity time integral equals 99 cm, the DVI=  0.23, consistent with severe aortic stenosis.  The highest matched R-R intervals were used.  The patient  was in atrial fibrillation at the time of the study. Mild  aortic regurgitation.  2. Estimated right ventricular systolic pressure equals 39  mm Hg, assuming right atrial pressure equals 5 mm Hg,  consistent with borderline pulmonary hypertension.   89 y.o. female presents today for elective cardiac catheterization. The patient c/o SOB with exertion (walking a few feet), resolve with rest, started a few month ago, getting progressively worse. She admits to b/l lower extremities edema.  The patient denies chest pain, palpitations, dizziness, presyncope, syncope,  headache, visual disturbances, PE, DVT, MIRA, abdominal pain, N/V/D/C, hematochezia, melena, dysuria, hematuria, fever, chills. The patient was evaluated by her cardiologist, had Echo, showed severe AS. Due to patient's symptoms, and abnormal non invasive tests, the patient was recommended  to have cardiac catheterization. The patient denies any complaints at present.   Upon physical exam, the patient was noted to have 30 degrees difference in s BP L arm sBP 170s, Rarm BP 140s. Dr. Diaz was made aware, and will perform aortogram to r/o subclavian artery stenosis.     Echo 4/2021  1. Calcified trileaflet aortic valve with decreased  opening. Peak transaortic valve gradient equals 74 mm Hg,  mean transaortic valve gradient equals 44 mm Hg, estimated  aortic valve area equals 0.7 sqcm (by continuity equation),  aortic valve velocity time integral equals 99 cm, the DVI=  0.23, consistent with severe aortic stenosis.  The highest matched R-R intervals were used.  The patient  was in atrial fibrillation at the time of the study. Mild  aortic regurgitation.  2. Estimated right ventricular systolic pressure equals 39  mm Hg, assuming right atrial pressure equals 5 mm Hg,  consistent with borderline pulmonary hypertension.   89 y.o. female presents today for elective cardiac catheterization. The patient c/o SOB with exertion (walking a few feet), resolve with rest, started a few month ago, getting progressively worse. She admits to b/l lower extremities edema.  The patient denies chest pain, palpitations, dizziness, presyncope, syncope,  headache, visual disturbances, PE, DVT, MIRA, abdominal pain, N/V/D/C, hematochezia, melena, dysuria, hematuria, fever, chills. The patient was evaluated by her cardiologist, had Echo, showed severe AS. Due to patient's symptoms, and abnormal non invasive tests, the patient was recommended  to have cardiac catheterization. The patient denies any complaints at present.   Upon physical exam, the patient was noted to have 30 degrees difference in s BP L arm sBP 170s, Rarm BP 140s. Dr. Diaz was made aware, and will perform aortogram to r/o subclavian artery stenosis.  Medications confirmed from patient's pharmacy, "[facility]"     Echo 4/2021  1. Calcified trileaflet aortic valve with decreased  opening. Peak transaortic valve gradient equals 74 mm Hg,  mean transaortic valve gradient equals 44 mm Hg, estimated  aortic valve area equals 0.7 sq cm (by continuity equation),  aortic valve velocity time integral equals 99 cm, the DVI=  0.23, consistent with severe aortic stenosis.  The highest matched R-R intervals were used.  The patient  was in atrial fibrillation at the time of the study. Mild  aortic regurgitation.  2. Estimated right ventricular systolic pressure equals 39  mm Hg, assuming right atrial pressure equals 5 mm Hg,  consistent with borderline pulmonary hypertension.   89 y.o. female presents today for elective cardiac catheterization. The patient c/o SOB with exertion (walking a few feet), resolve with rest, started a few month ago, getting progressively worse. She admits to b/l lower extremities edema.  The patient denies chest pain, palpitations, dizziness, presyncope, syncope,  headache, visual disturbances, PE, DVT, MIRA, abdominal pain, N/V/D/C, hematochezia, melena, dysuria, hematuria, fever, chills. The patient was evaluated by her cardiologist, had Echo, showed severe AS. Due to patient's symptoms, and abnormal non invasive tests, the patient was recommended  to have cardiac catheterization. The patient denies any complaints at present.   Upon physical exam, the patient was noted to have 30 degrees difference in s BP L arm sBP 170s, Rarm BP 140s. Dr. Pascal was made aware, and will perform aortogram to r/o subclavian artery stenosis.  Medications confirmed from patient's pharmacy, "[facility]"     Echo 4/2021  1. Calcified trileaflet aortic valve with decreased  opening. Peak transaortic valve gradient equals 74 mm Hg,  mean transaortic valve gradient equals 44 mm Hg, estimated  aortic valve area equals 0.7 sq cm (by continuity equation),  aortic valve velocity time integral equals 99 cm, the DVI=  0.23, consistent with severe aortic stenosis.  The highest matched R-R intervals were used.  The patient  was in atrial fibrillation at the time of the study. Mild  aortic regurgitation.  2. Estimated right ventricular systolic pressure equals 39  mm Hg, assuming right atrial pressure equals 5 mm Hg,  consistent with borderline pulmonary hypertension.

## 2021-05-10 NOTE — H&P CARDIOLOGY - REVIEW OF SYSTEMS
The patient denies chest pain,  palpitations, dizziness, presyncope, syncope,  headache, visual disturbances, CVA, PE, DVT, MIRA, abdominal pain, N/V/D/C, hematochezia, melena, dysuria, hematuria, fever, chills.

## 2021-05-10 NOTE — H&P CARDIOLOGY - PMH
Arthritis    Atrial fibrillation  on Pradaxa  CVA (cerebral vascular accident)  2011, aphasia  GERD (gastroesophageal reflux disease)    HTN (Hypertension)    Hyperlipidemia    Lumbar stenosis    Skin cancer  basal, treated surgically  TIA (Transient Ischemic Attack)    Tremor, Essential

## 2021-05-15 PROBLEM — M19.90 UNSPECIFIED OSTEOARTHRITIS, UNSPECIFIED SITE: Chronic | Status: ACTIVE | Noted: 2021-05-10

## 2021-05-15 PROBLEM — C44.90 UNSPECIFIED MALIGNANT NEOPLASM OF SKIN, UNSPECIFIED: Chronic | Status: ACTIVE | Noted: 2021-05-10

## 2021-05-18 ENCOUNTER — APPOINTMENT (OUTPATIENT)
Dept: CARDIOLOGY | Facility: CLINIC | Age: 86
End: 2021-05-18

## 2021-05-18 ENCOUNTER — RESULT REVIEW (OUTPATIENT)
Age: 86
End: 2021-05-18

## 2021-05-18 ENCOUNTER — OUTPATIENT (OUTPATIENT)
Dept: OUTPATIENT SERVICES | Facility: HOSPITAL | Age: 86
LOS: 1 days | End: 2021-05-18
Payer: MEDICARE

## 2021-05-18 DIAGNOSIS — Z96.651 PRESENCE OF RIGHT ARTIFICIAL KNEE JOINT: Chronic | ICD-10-CM

## 2021-05-18 DIAGNOSIS — Z98.89 OTHER SPECIFIED POSTPROCEDURAL STATES: Chronic | ICD-10-CM

## 2021-05-18 DIAGNOSIS — Z98.890 OTHER SPECIFIED POSTPROCEDURAL STATES: Chronic | ICD-10-CM

## 2021-05-18 DIAGNOSIS — Z90.89 ACQUIRED ABSENCE OF OTHER ORGANS: Chronic | ICD-10-CM

## 2021-05-18 DIAGNOSIS — I38 ENDOCARDITIS, VALVE UNSPECIFIED: ICD-10-CM

## 2021-05-18 DIAGNOSIS — Z98.42 CATARACT EXTRACTION STATUS, LEFT EYE: Chronic | ICD-10-CM

## 2021-05-18 DIAGNOSIS — Z00.00 ENCOUNTER FOR GENERAL ADULT MEDICAL EXAMINATION WITHOUT ABNORMAL FINDINGS: ICD-10-CM

## 2021-05-18 PROCEDURE — 75572 CT HRT W/3D IMAGE: CPT

## 2021-05-18 PROCEDURE — G1004: CPT

## 2021-05-18 PROCEDURE — 75572 CT HRT W/3D IMAGE: CPT | Mod: 26,ME

## 2021-05-25 ENCOUNTER — NON-APPOINTMENT (OUTPATIENT)
Age: 86
End: 2021-05-25

## 2021-05-25 ENCOUNTER — APPOINTMENT (OUTPATIENT)
Dept: CARDIOLOGY | Facility: CLINIC | Age: 86
End: 2021-05-25
Payer: MEDICARE

## 2021-05-25 VITALS
WEIGHT: 140 LBS | SYSTOLIC BLOOD PRESSURE: 130 MMHG | HEART RATE: 92 BPM | BODY MASS INDEX: 28.28 KG/M2 | OXYGEN SATURATION: 96 % | DIASTOLIC BLOOD PRESSURE: 80 MMHG

## 2021-05-25 DIAGNOSIS — I35.0 NONRHEUMATIC AORTIC (VALVE) STENOSIS: ICD-10-CM

## 2021-05-25 PROCEDURE — 93000 ELECTROCARDIOGRAM COMPLETE: CPT

## 2021-05-25 PROCEDURE — 99205 OFFICE O/P NEW HI 60 MIN: CPT

## 2021-05-25 NOTE — REASON FOR VISIT
[Other: ____] : [unfilled] [FreeTextEntry1] : Mrs. Oh is 89-year-old woman referred by Dr. Sy Wilkins for ongoing cardiac follow-up.  She has recently diagnosed severe aortic stenosis and is scheduled to undergo TAVR in 2 days.\par \par She suffered an embolic stroke tribute atrial fibrillation 9 years ago and has been inactive since then.  In recent months she notes worsening fatigue and some exertional dyspnea with minimal bilateral ankle edema.\par \par Her stroke residual disability is right-sided weakness.  She has voice disturbance which she attributes to her essential tremor but may be more pronounced after the stroke.  Her background is also remarkable for hypertension, obstructive sleep apnea and hyperlipidemia.  She is aware of a murmur since childhood.\par \par Echocardiography demonstrated severe AS with an orifice area of 0.7 cm² and the peak rate of 74 mm.  The ventricle was described as hyperdynamic.\par \par Coronary angiography was performed on May 10 and revealed a 20% ostial LAD lesion but normal circumflex and luminal irregularities elsewhere without occlusive disease.\par \par No c/o chest, throat,jaw, arm or upper back discomfort.  No orthopnea or PND.  No palpitations, dizziness or syncope.  No claudication.

## 2021-05-26 ENCOUNTER — OUTPATIENT (OUTPATIENT)
Dept: OUTPATIENT SERVICES | Facility: HOSPITAL | Age: 86
LOS: 1 days | End: 2021-05-26
Payer: MEDICARE

## 2021-05-26 ENCOUNTER — APPOINTMENT (OUTPATIENT)
Dept: ULTRASOUND IMAGING | Facility: HOSPITAL | Age: 86
End: 2021-05-26

## 2021-05-26 ENCOUNTER — TRANSCRIPTION ENCOUNTER (OUTPATIENT)
Age: 86
End: 2021-05-26

## 2021-05-26 ENCOUNTER — OUTPATIENT (OUTPATIENT)
Dept: OUTPATIENT SERVICES | Facility: HOSPITAL | Age: 86
LOS: 1 days | End: 2021-05-26

## 2021-05-26 VITALS
RESPIRATION RATE: 18 BRPM | OXYGEN SATURATION: 97 % | TEMPERATURE: 98 F | HEIGHT: 59 IN | DIASTOLIC BLOOD PRESSURE: 80 MMHG | HEART RATE: 82 BPM | SYSTOLIC BLOOD PRESSURE: 158 MMHG | WEIGHT: 138.01 LBS

## 2021-05-26 DIAGNOSIS — Z98.89 OTHER SPECIFIED POSTPROCEDURAL STATES: Chronic | ICD-10-CM

## 2021-05-26 DIAGNOSIS — I48.91 UNSPECIFIED ATRIAL FIBRILLATION: ICD-10-CM

## 2021-05-26 DIAGNOSIS — Z98.42 CATARACT EXTRACTION STATUS, LEFT EYE: Chronic | ICD-10-CM

## 2021-05-26 DIAGNOSIS — Z90.89 ACQUIRED ABSENCE OF OTHER ORGANS: Chronic | ICD-10-CM

## 2021-05-26 DIAGNOSIS — Z96.651 PRESENCE OF RIGHT ARTIFICIAL KNEE JOINT: Chronic | ICD-10-CM

## 2021-05-26 DIAGNOSIS — Z98.890 OTHER SPECIFIED POSTPROCEDURAL STATES: Chronic | ICD-10-CM

## 2021-05-26 DIAGNOSIS — Z01.818 ENCOUNTER FOR OTHER PREPROCEDURAL EXAMINATION: ICD-10-CM

## 2021-05-26 DIAGNOSIS — I35.0 NONRHEUMATIC AORTIC (VALVE) STENOSIS: ICD-10-CM

## 2021-05-26 DIAGNOSIS — Z29.9 ENCOUNTER FOR PROPHYLACTIC MEASURES, UNSPECIFIED: ICD-10-CM

## 2021-05-26 DIAGNOSIS — Z11.52 ENCOUNTER FOR SCREENING FOR COVID-19: ICD-10-CM

## 2021-05-26 DIAGNOSIS — I10 ESSENTIAL (PRIMARY) HYPERTENSION: ICD-10-CM

## 2021-05-26 LAB
A1C WITH ESTIMATED AVERAGE GLUCOSE RESULT: 5.9 % — HIGH (ref 4–5.6)
ANION GAP SERPL CALC-SCNC: 15 MMOL/L — SIGNIFICANT CHANGE UP (ref 5–17)
BLD GP AB SCN SERPL QL: NEGATIVE — SIGNIFICANT CHANGE UP
BUN SERPL-MCNC: 18 MG/DL — SIGNIFICANT CHANGE UP (ref 7–23)
CALCIUM SERPL-MCNC: 9.8 MG/DL — SIGNIFICANT CHANGE UP (ref 8.4–10.5)
CHLORIDE SERPL-SCNC: 107 MMOL/L — SIGNIFICANT CHANGE UP (ref 96–108)
CO2 SERPL-SCNC: 18 MMOL/L — LOW (ref 22–31)
CREAT SERPL-MCNC: 0.81 MG/DL — SIGNIFICANT CHANGE UP (ref 0.5–1.3)
ESTIMATED AVERAGE GLUCOSE: 123 MG/DL — HIGH (ref 68–114)
GLUCOSE SERPL-MCNC: 95 MG/DL — SIGNIFICANT CHANGE UP (ref 70–99)
HCT VFR BLD CALC: 39.2 % — SIGNIFICANT CHANGE UP (ref 34.5–45)
HGB BLD-MCNC: 12.9 G/DL — SIGNIFICANT CHANGE UP (ref 11.5–15.5)
MCHC RBC-ENTMCNC: 29.9 PG — SIGNIFICANT CHANGE UP (ref 27–34)
MCHC RBC-ENTMCNC: 32.9 GM/DL — SIGNIFICANT CHANGE UP (ref 32–36)
MCV RBC AUTO: 91 FL — SIGNIFICANT CHANGE UP (ref 80–100)
MRSA PCR RESULT.: SIGNIFICANT CHANGE UP
NRBC # BLD: 0 /100 WBCS — SIGNIFICANT CHANGE UP (ref 0–0)
PLATELET # BLD AUTO: 290 K/UL — SIGNIFICANT CHANGE UP (ref 150–400)
POTASSIUM SERPL-MCNC: 4.5 MMOL/L — SIGNIFICANT CHANGE UP (ref 3.5–5.3)
POTASSIUM SERPL-SCNC: 4.5 MMOL/L — SIGNIFICANT CHANGE UP (ref 3.5–5.3)
RBC # BLD: 4.31 M/UL — SIGNIFICANT CHANGE UP (ref 3.8–5.2)
RBC # FLD: 14 % — SIGNIFICANT CHANGE UP (ref 10.3–14.5)
RH IG SCN BLD-IMP: POSITIVE — SIGNIFICANT CHANGE UP
S AUREUS DNA NOSE QL NAA+PROBE: SIGNIFICANT CHANGE UP
SARS-COV-2 RNA SPEC QL NAA+PROBE: SIGNIFICANT CHANGE UP
SODIUM SERPL-SCNC: 140 MMOL/L — SIGNIFICANT CHANGE UP (ref 135–145)
WBC # BLD: 7.76 K/UL — SIGNIFICANT CHANGE UP (ref 3.8–10.5)
WBC # FLD AUTO: 7.76 K/UL — SIGNIFICANT CHANGE UP (ref 3.8–10.5)

## 2021-05-26 PROCEDURE — 71046 X-RAY EXAM CHEST 2 VIEWS: CPT | Mod: 26

## 2021-05-26 PROCEDURE — 93880 EXTRACRANIAL BILAT STUDY: CPT | Mod: 26

## 2021-05-26 RX ORDER — CEFUROXIME AXETIL 250 MG
1500 TABLET ORAL ONCE
Refills: 0 | Status: DISCONTINUED | OUTPATIENT
Start: 2021-05-27 | End: 2021-05-29

## 2021-05-26 NOTE — H&P PST ADULT - NSICDXPASTMEDICALHX_GEN_ALL_CORE_FT
PAST MEDICAL HISTORY:  Arthritis     Atrial fibrillation on Pradaxa    CVA (cerebral vascular accident) 2011, aphasia    GERD (gastroesophageal reflux disease)     HTN (Hypertension)     Hyperlipidemia     Lumbar stenosis     Nonrheumatic aortic valve stenosis     Skin cancer basal, treated surgically    TIA (Transient Ischemic Attack) ~10 yrs ago    Tremor, Essential      PAST MEDICAL HISTORY:  Arthritis     Atrial fibrillation on Pradaxa    CVA (cerebral vascular accident) 2011, aphasia    GERD (gastroesophageal reflux disease)     HTN (Hypertension)     Hyperlipidemia     Lumbar stenosis     Nonrheumatic aortic valve stenosis     Skin cancer basal, treated surgically    Tremor, Essential

## 2021-05-26 NOTE — H&P PST ADULT - NSICDXPASTSURGICALHX_GEN_ALL_CORE_FT
PAST SURGICAL HISTORY:  H/O arthroscopy joint replacement b/l 1MCP b/l    H/O total knee replacement, right     History of D&C     History of tonsillectomy     S/P left cataract extraction B/L    S/P rotator cuff repair right

## 2021-05-26 NOTE — H&P PST ADULT - NSICDXPROBLEM_GEN_ALL_CORE_FT
PROBLEM DIAGNOSES  Problem: Atrial fibrillation  Assessment and Plan: Pradaxa stopped yesterday as per pt.     Problem: Nonrheumatic aortic valve stenosis  Assessment and Plan: Pt scheduled for sx tomorrow 5/27/21. Surgical, chlorhexidine and incentive spirometry instructions reviewed w/ pt. COVID testing completed today.     Problem: Need for prophylactic measure  Assessment and Plan: The Caprini score indicates that this patient is at high risk for a VTE event (score 6 or greater). Surgical patients in this group will benefit from both pharmacologic prophylaxis and intermittent compression devices.  The surgical team will determine the balance between VTE risk and bleeding risk, and other clinical considerations     Problem: Hypertension  Assessment and Plan: Pt to hold medication in AM day of sx.

## 2021-05-26 NOTE — H&P PST ADULT - HISTORY OF PRESENT ILLNESS
89 y.o. female presents today for elective cardiac catheterization. The patient c/o SOB with exertion (walking a few feet), resolve with rest, started a few month ago, getting progressively worse. She admits to b/l lower extremities edema.  The patient denies chest pain, palpitations, dizziness, presyncope, syncope,  headache, visual disturbances, PE, DVT, MIRA, abdominal pain, N/V/D/C, hematochezia, melena, dysuria, hematuria, fever, chills. The patient was evaluated by her cardiologist, had Echo, showed severe AS. Due to patient's symptoms, and abnormal non invasive tests, the patient was recommended  to have cardiac catheterization. The patient denies any complaints at present.   Upon physical exam, the patient was noted to have 30 degrees difference in s BP L arm sBP 170s, Rarm BP 140s. Dr. Pascal was made aware, and will perform aortogram to r/o subclavian artery stenosis.  Medications confirmed from patient's pharmacy, "[facility]"     Echo 4/2021  1. Calcified trileaflet aortic valve with decreased  opening. Peak transaortic valve gradient equals 74 mm Hg,  mean transaortic valve gradient equals 44 mm Hg, estimated  aortic valve area equals 0.7 sq cm (by continuity equation),  aortic valve velocity time integral equals 99 cm, the DVI=  0.23, consistent with severe aortic stenosis.  The highest matched R-R intervals were used.  The patient  was in atrial fibrillation at the time of the study. Mild  aortic regurgitation.  2. Estimated right ventricular systolic pressure equals 39  mm Hg, assuming right atrial pressure equals 5 mm Hg,  consistent with borderline pulmonary hypertension.   89 y.o. female presents to PST accompanied by spouse and home aid for a TAVR on 5/26/21. Patient with c/o SOB with exertion (walking a few feet), resolve with rest, started a few month ago, getting progressively worse with b/l lower extremities edema.  Denies chest pain, palpitations, dizziness, presyncope, syncope,  headache, visual disturbances, abdominal pain, N/V/D/C, fever or chills. The patient was evaluated by her cardiologist, had Echo, showed severe AS. PMH: CVA (w/ aphasia and right sided weakness), HTN, HLD, GERD, AS and AFIB (Pradaxa stopped yesterday 5/25/21 as per pt). COVID swab completed [facility] at [facility].  89 y.o. female presents to PST accompanied by spouse and home aid for a TAVR on 5/26/21. Patient with c/o SOB with exertion (walking a few feet), resolve with rest, started a few month ago, getting progressively worse with b/l lower extremities edema.  Denies chest pain, palpitations, dizziness, presyncope, syncope,  headache, visual disturbances, abdominal pain, N/V/D/C, fever or chills. The patient was evaluated by her cardiologist, had Echo, showed severe AS. PMH: CVA (w/ aphasia and right sided weakness), HTN, HLD, GERD, AS and AFIB (Pradaxa stopped yesterday 5/25/21 as per pt). Pt states vaccinated w/ Moderna, 2nd dose in February. COVID swab completed today at [facility].

## 2021-05-26 NOTE — H&P PST ADULT - ASSESSMENT
CAPRINI SCORE [CLOT updated 18]    AGE RELATED RISK FACTORS                                                       MOBILITY RELATED FACTORS  [ ] Age 41-60 years                                            (1 Point)                    [ ] Bed rest                                                        (1 Point)  [ ] Age: 61-74 years                                           (2 Points)                  [ ] Plaster cast                                                   (2 Points)  [ ] Age= 75 years                                              (3 Points)                    [ ] Bed bound for more than 72 hours                 (2 Points)    DISEASE RELATED RISK FACTORS                                               GENDER SPECIFIC FACTORS  [ ] Edema in the lower extremities                       (1 Point)              [ ] Pregnancy                                                     (1 Point)  [ ] Varicose veins                                               (1 Point)                     [ ] Post-partum < 6 weeks                                   (1 Point)             [ ] BMI > 25 Kg/m2                                            (1 Point)                     [ ] Hormonal therapy  or oral contraception          (1 Point)                 [ ] Sepsis (in the previous month)                        (1 Point)               [ ] History of pregnancy complications                 (1 point)  [ ] Pneumonia or serious lung disease                                               [ ] Unexplained or recurrent                     (1 Point)           (in the previous month)                               (1 Point)  [ ] Abnormal pulmonary function test                     (1 Point)                 SURGERY RELATED RISK FACTORS  [ ] Acute myocardial infarction                              (1 Point)               [ ]  Section                                             (1 Point)  [ ] Congestive heart failure (in the previous month)  (1 Point)      [ ] Minor surgery                                                  (1 Point)   [ ] Inflammatory bowel disease                             (1 Point)               [ ] Arthroscopic surgery                                        (2 Points)  [ ] Central venous access                                      (2 Points)                [ ] General surgery lasting more than 45 minutes (2 points)  [ ] Present or previous malignancy                     (2 Points)                [ ] Elective arthroplasty                                         (5 points)    [ ] Stroke (in the previous month)                          (5 Points)                                                                                                                                                           HEMATOLOGY RELATED FACTORS                                                 TRAUMA RELATED RISK FACTORS  [ ] Prior episodes of VTE                                     (3 Points)                [ ] Fracture of the hip, pelvis, or leg                       (5 Points)  [ ] Positive family history for VTE                         (3 Points)             [ ] Acute spinal cord injury (in the previous month)  (5 Points)  [ ] Prothrombin 73713 A                                     (3 Points)               [ ] Paralysis  (less than 1 month)                             (5 Points)  [ ] Factor V Leiden                                             (3 Points)                  [ ] Multiple Trauma within 1 month                        (5 Points)  [ ] Lupus anticoagulants                                     (3 Points)                                                           [ ] Anticardiolipin antibodies                               (3 Points)                                                       [ ] High homocysteine in the blood                      (3 Points)                                             [ ] Other congenital or acquired thrombophilia      (3 Points)                                                [ ] Heparin induced thrombocytopenia                  (3 Points)                                     Total Score [ 6  ]

## 2021-05-26 NOTE — H&P PST ADULT - PEDAL EDEMA SEVERITY
No s/sx of distress observed or verbalized. On 3 LNC. Alert and watching TV. Periodically looks out door. 2+

## 2021-05-27 ENCOUNTER — APPOINTMENT (OUTPATIENT)
Dept: CARDIOTHORACIC SURGERY | Facility: HOSPITAL | Age: 86
End: 2021-05-27

## 2021-05-27 ENCOUNTER — INPATIENT (INPATIENT)
Facility: HOSPITAL | Age: 86
LOS: 1 days | Discharge: HOME | DRG: 267 | End: 2021-05-29
Attending: THORACIC SURGERY (CARDIOTHORACIC VASCULAR SURGERY) | Admitting: THORACIC SURGERY (CARDIOTHORACIC VASCULAR SURGERY)
Payer: MEDICARE

## 2021-05-27 VITALS
OXYGEN SATURATION: 99 % | HEIGHT: 59 IN | HEART RATE: 91 BPM | SYSTOLIC BLOOD PRESSURE: 131 MMHG | RESPIRATION RATE: 18 BRPM | DIASTOLIC BLOOD PRESSURE: 86 MMHG | WEIGHT: 139.11 LBS | TEMPERATURE: 98 F

## 2021-05-27 DIAGNOSIS — Z95.2 PRESENCE OF PROSTHETIC HEART VALVE: ICD-10-CM

## 2021-05-27 DIAGNOSIS — I35.0 NONRHEUMATIC AORTIC (VALVE) STENOSIS: ICD-10-CM

## 2021-05-27 DIAGNOSIS — Z98.89 OTHER SPECIFIED POSTPROCEDURAL STATES: Chronic | ICD-10-CM

## 2021-05-27 DIAGNOSIS — Z96.651 PRESENCE OF RIGHT ARTIFICIAL KNEE JOINT: Chronic | ICD-10-CM

## 2021-05-27 DIAGNOSIS — Z98.890 OTHER SPECIFIED POSTPROCEDURAL STATES: Chronic | ICD-10-CM

## 2021-05-27 DIAGNOSIS — Z90.89 ACQUIRED ABSENCE OF OTHER ORGANS: Chronic | ICD-10-CM

## 2021-05-27 DIAGNOSIS — Z98.42 CATARACT EXTRACTION STATUS, LEFT EYE: Chronic | ICD-10-CM

## 2021-05-27 LAB
ALBUMIN SERPL ELPH-MCNC: 3.4 G/DL — SIGNIFICANT CHANGE UP (ref 3.3–5)
ALP SERPL-CCNC: 85 U/L — SIGNIFICANT CHANGE UP (ref 40–120)
ALT FLD-CCNC: 25 U/L — SIGNIFICANT CHANGE UP (ref 10–45)
ANION GAP SERPL CALC-SCNC: 14 MMOL/L — SIGNIFICANT CHANGE UP (ref 5–17)
APTT BLD: 41.3 SEC — HIGH (ref 27.5–35.5)
AST SERPL-CCNC: 26 U/L — SIGNIFICANT CHANGE UP (ref 10–40)
BILIRUB SERPL-MCNC: 0.5 MG/DL — SIGNIFICANT CHANGE UP (ref 0.2–1.2)
BUN SERPL-MCNC: 21 MG/DL — SIGNIFICANT CHANGE UP (ref 7–23)
CALCIUM SERPL-MCNC: 8.4 MG/DL — SIGNIFICANT CHANGE UP (ref 8.4–10.5)
CHLORIDE SERPL-SCNC: 107 MMOL/L — SIGNIFICANT CHANGE UP (ref 96–108)
CO2 SERPL-SCNC: 19 MMOL/L — LOW (ref 22–31)
CREAT SERPL-MCNC: 0.84 MG/DL — SIGNIFICANT CHANGE UP (ref 0.5–1.3)
GLUCOSE BLDC GLUCOMTR-MCNC: 104 MG/DL — HIGH (ref 70–99)
GLUCOSE SERPL-MCNC: 104 MG/DL — HIGH (ref 70–99)
HCT VFR BLD CALC: 33.7 % — LOW (ref 34.5–45)
HGB BLD-MCNC: 10.8 G/DL — LOW (ref 11.5–15.5)
INR BLD: 1.19 RATIO — HIGH (ref 0.88–1.16)
MCHC RBC-ENTMCNC: 29.4 PG — SIGNIFICANT CHANGE UP (ref 27–34)
MCHC RBC-ENTMCNC: 32 GM/DL — SIGNIFICANT CHANGE UP (ref 32–36)
MCV RBC AUTO: 91.8 FL — SIGNIFICANT CHANGE UP (ref 80–100)
NRBC # BLD: 0 /100 WBCS — SIGNIFICANT CHANGE UP (ref 0–0)
PLATELET # BLD AUTO: 227 K/UL — SIGNIFICANT CHANGE UP (ref 150–400)
POTASSIUM SERPL-MCNC: 4 MMOL/L — SIGNIFICANT CHANGE UP (ref 3.5–5.3)
POTASSIUM SERPL-SCNC: 4 MMOL/L — SIGNIFICANT CHANGE UP (ref 3.5–5.3)
PROT SERPL-MCNC: 6 G/DL — SIGNIFICANT CHANGE UP (ref 6–8.3)
PROTHROM AB SERPL-ACNC: 14.2 SEC — HIGH (ref 10.6–13.6)
RBC # BLD: 3.67 M/UL — LOW (ref 3.8–5.2)
RBC # FLD: 14 % — SIGNIFICANT CHANGE UP (ref 10.3–14.5)
SARS-COV-2 RNA SPEC QL NAA+PROBE: SIGNIFICANT CHANGE UP
SODIUM SERPL-SCNC: 140 MMOL/L — SIGNIFICANT CHANGE UP (ref 135–145)
WBC # BLD: 9.06 K/UL — SIGNIFICANT CHANGE UP (ref 3.8–10.5)
WBC # FLD AUTO: 9.06 K/UL — SIGNIFICANT CHANGE UP (ref 3.8–10.5)

## 2021-05-27 PROCEDURE — 93306 TTE W/DOPPLER COMPLETE: CPT | Mod: 26

## 2021-05-27 PROCEDURE — 33361 REPLACE AORTIC VALVE PERQ: CPT | Mod: 62,Q0

## 2021-05-27 PROCEDURE — 93010 ELECTROCARDIOGRAM REPORT: CPT

## 2021-05-27 RX ORDER — ATORVASTATIN CALCIUM 80 MG/1
80 TABLET, FILM COATED ORAL AT BEDTIME
Refills: 0 | Status: DISCONTINUED | OUTPATIENT
Start: 2021-05-27 | End: 2021-05-29

## 2021-05-27 RX ORDER — CHLORHEXIDINE GLUCONATE 213 G/1000ML
1 SOLUTION TOPICAL ONCE
Refills: 0 | Status: DISCONTINUED | OUTPATIENT
Start: 2021-05-27 | End: 2021-05-27

## 2021-05-27 RX ORDER — SODIUM CHLORIDE 9 MG/ML
3 INJECTION INTRAMUSCULAR; INTRAVENOUS; SUBCUTANEOUS EVERY 8 HOURS
Refills: 0 | Status: DISCONTINUED | OUTPATIENT
Start: 2021-05-27 | End: 2021-05-27

## 2021-05-27 RX ORDER — LIDOCAINE HCL 20 MG/ML
0.2 VIAL (ML) INJECTION ONCE
Refills: 0 | Status: DISCONTINUED | OUTPATIENT
Start: 2021-05-27 | End: 2021-05-27

## 2021-05-27 RX ORDER — CEFUROXIME AXETIL 250 MG
1500 TABLET ORAL EVERY 8 HOURS
Refills: 0 | Status: COMPLETED | OUTPATIENT
Start: 2021-05-27 | End: 2021-05-28

## 2021-05-27 RX ORDER — PANTOPRAZOLE SODIUM 20 MG/1
40 TABLET, DELAYED RELEASE ORAL
Refills: 0 | Status: DISCONTINUED | OUTPATIENT
Start: 2021-05-28 | End: 2021-05-29

## 2021-05-27 RX ORDER — ASPIRIN/CALCIUM CARB/MAGNESIUM 324 MG
81 TABLET ORAL DAILY
Refills: 0 | Status: DISCONTINUED | OUTPATIENT
Start: 2021-05-27 | End: 2021-05-29

## 2021-05-27 RX ORDER — MULTIVIT-MIN/FERROUS GLUCONATE 9 MG/15 ML
1 LIQUID (ML) ORAL
Qty: 0 | Refills: 0 | DISCHARGE

## 2021-05-27 RX ORDER — SERTRALINE 25 MG/1
100 TABLET, FILM COATED ORAL
Refills: 0 | Status: DISCONTINUED | OUTPATIENT
Start: 2021-05-27 | End: 2021-05-29

## 2021-05-27 RX ADMIN — SERTRALINE 100 MILLIGRAM(S): 25 TABLET, FILM COATED ORAL at 19:55

## 2021-05-27 RX ADMIN — Medication 100 MILLIGRAM(S): at 21:29

## 2021-05-27 RX ADMIN — ATORVASTATIN CALCIUM 80 MILLIGRAM(S): 80 TABLET, FILM COATED ORAL at 21:29

## 2021-05-27 RX ADMIN — Medication 81 MILLIGRAM(S): at 19:55

## 2021-05-27 NOTE — CHART NOTE - NSCHARTNOTEFT_GEN_A_CORE
s/p TAVR  23 Evolute pro    Transfemoral approach  RFV perclose 14 Fr  LFA perclose  LFV manual hold- TVP out    [place] device: RRA due off at 1600    conscious sedation  no AI post deployment of device    MEDS  pradaxa tomorrow  ASA today 6 hours post procedure    Dispo: pending

## 2021-05-27 NOTE — PROGRESS NOTE ADULT - SUBJECTIVE AND OBJECTIVE BOX
Interval Hx; Events Overnight:  SUBJECTIVE: "I feel okay   "    LABS:                10.8                 x    | x    | x            9.06  >-----------< 227     ------------------------< x                     33.7                 x    | x    | x                                            Ca x     Mg x     Ph x          PT/INR - ( 27 May 2021 16:13 )   PT: 14.2 sec;   INR: 1.19 ratio         PTT - ( 27 May 2021 16:13 )  PTT:41.3 sec    VITAL SIGNS    Telemetry: afib 60s     Daily Height in cm: 149.86 (27 May 2021 13:00)    Daily       Vital Signs Last 24 Hrs  T(C): 36.5 (05-27-21 @ 15:05), Max: 36.8 (05-27-21 @ 10:52)  T(F): 97.7 (05-27-21 @ 15:05), Max: 98.2 (05-27-21 @ 10:52)  HR: 67 (05-27-21 @ 18:35) (54 - 91)  BP: 132/71 (05-27-21 @ 18:35) (95/51 - 143/83)  RR: 14 (05-27-21 @ 18:35) (14 - 18)  SpO2: 97% (05-27-21 @ 18:35) (95% - 100%)             I&O's Detail                GLUCOSE  CAPILLARY BLOOD GLUCOSE      POCT Blood Glucose.: 104 mg/dL (27 May 2021 10:04)                PHYSICAL EXAM      General: NAD, well appearing, in no distress  Neurology: A&O x3, non focal, no neuro deficits. Moves all extremities to command.  CV : s1 s2 RRR, no murmurs, gallops, clicks.   Lungs: clear to auscultation  Abdomen: soft, nontender, nondistended, positive bowel sounds  :    voiding        Extremities:    no  edema. + pedal pulses      Incision: B/L leg harvest site incisions cdi + acewrap  Skin: intact, no lesions        MEDICATIONS  aspirin enteric coated 81 milliGRAM(s) Oral daily  atorvastatin 80 milliGRAM(s) Oral at bedtime  cefuroxime  IVPB 1500 milliGRAM(s) IV Intermittent every 8 hours  cefuroxime  IVPB 1500 milliGRAM(s) IV Intermittent once  sertraline 100 milliGRAM(s) Oral two times a day

## 2021-05-27 NOTE — PRE-ANESTHESIA EVALUATION ADULT - NSANTHPMHFT_GEN_ALL_CORE
89 y.o. female presents to PST accompanied by spouse and home aid for a TAVR on 5/26/21. Patient with c/o SOB with exertion (walking a few feet), resolve with rest, started a few month ago, getting progressively worse with b/l lower extremities edema

## 2021-05-27 NOTE — BRIEF OPERATIVE NOTE - NSICDXBRIEFPROCEDURE_GEN_ALL_CORE_FT
PROCEDURES:  TAVR, percutaneous femoral approach, using prosthetic valve 27-May-2021 15:11:45 #[id number] Pavel Charles

## 2021-05-27 NOTE — CONSULT NOTE ADULT - SUBJECTIVE AND OBJECTIVE BOX
HISTORY OF PRESENT ILLNESS: HPI:  Patient is a 89 y/o female well known to our office (Cardiologist - Dr. 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 Patient had complaints of [place] and LE edema. Patient feeling well post procedure in recovery. Denies chest pain, SOB, palpitations, dizziness, or syncope.    PAST MEDICAL & SURGICAL HISTORY:  HTN (Hypertension)    Hyperlipidemia    Tremor, Essential    Atrial fibrillation  on Pradaxa    GERD (gastroesophageal reflux disease)    CVA (cerebral vascular accident)  2011, aphasia    Lumbar stenosis    Skin cancer  basal, treated surgically    Arthritis    Nonrheumatic aortic valve stenosis    S/P rotator cuff repair  right    History of D&amp;C    S/P left cataract extraction  B/L    H/O arthroscopy  joint replacement b/l 1MCP b/l    H/O total knee replacement, right    History of tonsillectomy            MEDICATIONS:  MEDICATIONS  (STANDING):  aspirin enteric coated 81 milliGRAM(s) Oral daily  cefuroxime  IVPB 1500 milliGRAM(s) IV Intermittent every 8 hours  cefuroxime  IVPB 1500 milliGRAM(s) IV Intermittent once      Allergies    No Known Allergies    Intolerances        FAMILY HISTORY:  No pertinent family history in first degree relatives      Non-contributary for premature coronary disease or sudden cardiac death    SOCIAL HISTORY:    [x ] Non-smoker  [ ] Smoker  [ ] Alcohol    FLU VACCINE THIS YEAR STARTS IN AUGUST:  [ ] Yes    [ ] No    IF OVER 65 HAVE YOU EVER HAD A PNA VACCINE:  [ ] Yes    [ ] No       [ ] N/A      REVIEW OF SYSTEMS:  [ ]chest pain  [x  ]shortness of breath  [  ]palpitations  [  ]syncope  [ ]near syncope [ ]upper extremity weakness   [ ] lower extremity weakness  [  ]diplopia  [  ]altered mental status   [  ]fevers  [ ]chills [ ]nausea  [ ]vomitting  [  ]dysphagia    [ ]abdominal pain  [ ]melena  [ ]BRBPR    [  ]epistaxis  [  ]rash    [x ]lower extremity edema        [x ] All others negative	  [ ] Unable to obtain      LABS:	 	    CARDIAC MARKERS:                              10.8   9.06  )-----------( 227      ( 27 May 2021 16:29 )             33.7     Hb Trend: 10.8<--    05-27    140  |  107  |  21  ----------------------------<  104<H>  4.0   |  19<L>  |  0.84    Ca    8.4      27 May 2021 15:43    TPro  6.0  /  Alb  3.4  /  TBili  0.5  /  DBili  x   /  AST  26  /  ALT  25  /  AlkPhos  85  05-27    Creatinine Trend: 0.84<--, 0.81<--, 0.87<--    Coags:  PT/INR - ( 27 May 2021 16:13 )   PT: 14.2 sec;   INR: 1.19 ratio         PTT - ( 27 May 2021 16:13 )  PTT:41.3 sec    proBNP:   Lipid Profile:   HgA1c:   TSH:         PHYSICAL EXAM:  T(C): 36.5 (05-27-21 @ 15:05), Max: 36.8 (05-27-21 @ 10:52)  HR: 54 (05-27-21 @ 17:05) (54 - 91)  BP: 128/60 (05-27-21 @ 17:05) (95/51 - 131/86)  RR: 16 (05-27-21 @ 17:05) (16 - 18)  SpO2: 99% (05-27-21 @ 17:05) (95% - 99%)  Wt(kg): --   BMI (kg/m2): 28.1 (05-27-21 @ 13:00)  I&O's Summary      Gen: Appears well in NAD  HEENT:  (-)icterus (-)pallor  CV: N S1 S2 1/6 NAZ (+)2 Pulses B/l  Resp:  Clear to auscultation B/L, normal effort  GI: (+) BS Soft, NT, ND  Lymph:  (-)Edema, (-)obvious lymphadenopathy  Skin: Warm to touch, Normal turgor  Psych: Appropriate mood and affect    TELEMETRY:  	      ECG: Afib 66 bpm, anteroseptal infarct age undetermined  	    RADIOLOGY:         CXR: < from: Xray Chest 2 Views PA/Lat (05.26.21 @ 13:12) >  IMPRESSION:    The heart is normal in size. Lungs are clear. No pleural effusion. No pneumothorax. No acute bony pathology could be identified.      < end of copied text >      ASSESSMENT/PLAN: Patient is a 89 y/o female well known to our office (Cardiologist - Dr. 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    - Monitor tele/EKG post op  - CTS/Structural heart follow up  - Will follow with you, post op care appreciated  - Patient to f/u in our office with Dr. Luna after d/c    Redd Tirado PA-C  Pager: [phone]

## 2021-05-27 NOTE — PROGRESS NOTE ADULT - ASSESSMENT
89 y.o. female PMHx: CVA (w/ aphasia and right sided weakness), HTN, HLD, GERD, AS and AFIB on Pradaxa. Now for planned TAVR.     5/27 s/p TAVR 23 Evolute pro, Transfemoral approach, conscious sedation

## 2021-05-28 ENCOUNTER — FORM ENCOUNTER (OUTPATIENT)
Age: 86
End: 2021-05-28

## 2021-05-28 LAB
ALBUMIN SERPL ELPH-MCNC: 4.1 G/DL — SIGNIFICANT CHANGE UP (ref 3.3–5)
ALP SERPL-CCNC: 94 U/L — SIGNIFICANT CHANGE UP (ref 40–120)
ALT FLD-CCNC: 25 U/L — SIGNIFICANT CHANGE UP (ref 10–45)
ANION GAP SERPL CALC-SCNC: 14 MMOL/L — SIGNIFICANT CHANGE UP (ref 5–17)
AST SERPL-CCNC: 31 U/L — SIGNIFICANT CHANGE UP (ref 10–40)
BILIRUB SERPL-MCNC: 0.7 MG/DL — SIGNIFICANT CHANGE UP (ref 0.2–1.2)
BUN SERPL-MCNC: 14 MG/DL — SIGNIFICANT CHANGE UP (ref 7–23)
CALCIUM SERPL-MCNC: 9.2 MG/DL — SIGNIFICANT CHANGE UP (ref 8.4–10.5)
CHLORIDE SERPL-SCNC: 106 MMOL/L — SIGNIFICANT CHANGE UP (ref 96–108)
CO2 SERPL-SCNC: 19 MMOL/L — LOW (ref 22–31)
CREAT SERPL-MCNC: 0.72 MG/DL — SIGNIFICANT CHANGE UP (ref 0.5–1.3)
GLUCOSE SERPL-MCNC: 113 MG/DL — HIGH (ref 70–99)
HCT VFR BLD CALC: 38.6 % — SIGNIFICANT CHANGE UP (ref 34.5–45)
HGB BLD-MCNC: 12.4 G/DL — SIGNIFICANT CHANGE UP (ref 11.5–15.5)
MCHC RBC-ENTMCNC: 29.2 PG — SIGNIFICANT CHANGE UP (ref 27–34)
MCHC RBC-ENTMCNC: 32.1 GM/DL — SIGNIFICANT CHANGE UP (ref 32–36)
MCV RBC AUTO: 91 FL — SIGNIFICANT CHANGE UP (ref 80–100)
NRBC # BLD: 0 /100 WBCS — SIGNIFICANT CHANGE UP (ref 0–0)
PLATELET # BLD AUTO: 249 K/UL — SIGNIFICANT CHANGE UP (ref 150–400)
POTASSIUM SERPL-MCNC: 3.9 MMOL/L — SIGNIFICANT CHANGE UP (ref 3.5–5.3)
POTASSIUM SERPL-SCNC: 3.9 MMOL/L — SIGNIFICANT CHANGE UP (ref 3.5–5.3)
PROT SERPL-MCNC: 7.1 G/DL — SIGNIFICANT CHANGE UP (ref 6–8.3)
RBC # BLD: 4.24 M/UL — SIGNIFICANT CHANGE UP (ref 3.8–5.2)
RBC # FLD: 14.2 % — SIGNIFICANT CHANGE UP (ref 10.3–14.5)
SODIUM SERPL-SCNC: 139 MMOL/L — SIGNIFICANT CHANGE UP (ref 135–145)
WBC # BLD: 10.62 K/UL — HIGH (ref 3.8–10.5)
WBC # FLD AUTO: 10.62 K/UL — HIGH (ref 3.8–10.5)

## 2021-05-28 PROCEDURE — 93306 TTE W/DOPPLER COMPLETE: CPT | Mod: 26

## 2021-05-28 PROCEDURE — 93010 ELECTROCARDIOGRAM REPORT: CPT

## 2021-05-28 PROCEDURE — 99231 SBSQ HOSP IP/OBS SF/LOW 25: CPT

## 2021-05-28 RX ORDER — DABIGATRAN ETEXILATE MESYLATE 150 MG/1
150 CAPSULE ORAL EVERY 12 HOURS
Refills: 0 | Status: DISCONTINUED | OUTPATIENT
Start: 2021-05-28 | End: 2021-05-29

## 2021-05-28 RX ADMIN — SERTRALINE 100 MILLIGRAM(S): 25 TABLET, FILM COATED ORAL at 17:59

## 2021-05-28 RX ADMIN — PANTOPRAZOLE SODIUM 40 MILLIGRAM(S): 20 TABLET, DELAYED RELEASE ORAL at 05:29

## 2021-05-28 RX ADMIN — SERTRALINE 100 MILLIGRAM(S): 25 TABLET, FILM COATED ORAL at 05:29

## 2021-05-28 RX ADMIN — Medication 81 MILLIGRAM(S): at 12:33

## 2021-05-28 RX ADMIN — ATORVASTATIN CALCIUM 80 MILLIGRAM(S): 80 TABLET, FILM COATED ORAL at 21:58

## 2021-05-28 RX ADMIN — Medication 100 MILLIGRAM(S): at 05:29

## 2021-05-28 RX ADMIN — DABIGATRAN ETEXILATE MESYLATE 150 MILLIGRAM(S): 150 CAPSULE ORAL at 17:59

## 2021-05-28 NOTE — PROGRESS NOTE ADULT - SUBJECTIVE AND OBJECTIVE BOX
Structural Heart Team    Ms Oh has no complaints this morning.  She denies chest pain/pressure, sob and dizziness as well as groin pain.  There were no acute events overnight.       REVIEW OF SYSTEMS:    CONSTITUTIONAL: No weakness, fevers or chills  EYES/ENT: No visual changes;  No vertigo or throat pain   NECK: No pain or stiffness  RESPIRATORY: No cough, wheezing, hemoptysis; No shortness of breath  CARDIOVASCULAR: No chest pain or palpitations  GASTROINTESTINAL: No abdominal or epigastric pain. No nausea, vomiting, or hematemesis; No diarrhea or constipation. No melena or hematochezia.  GENITOURINARY: No dysuria, frequency or hematuria  NEUROLOGICAL: No numbness or weakness  SKIN: No itching, rashes      Allergies    No Known Allergies    Intolerances      Vital Signs Last 24 Hrs  T(C): 36.9 (28 May 2021 05:27), Max: 36.9 (27 May 2021 21:06)  T(F): 98.4 (28 May 2021 05:27), Max: 98.4 (27 May 2021 21:06)  HR: 84 (28 May 2021 05:27) (54 - 91)  BP: 151/90 (28 May 2021 05:27) (95/51 - 151/90)  BP(mean): 81 (27 May 2021 21:06) (81 - 95)  RR: 18 (28 May 2021 05:27) (14 - 18)  SpO2: 96% (28 May 2021 05:27) (95% - 100%)    MEDICATIONS  (STANDING):  aspirin enteric coated 81 milliGRAM(s) Oral daily  atorvastatin 80 milliGRAM(s) Oral at bedtime  cefuroxime  IVPB 1500 milliGRAM(s) IV Intermittent once  dabigatran 150 milliGRAM(s) Oral every 12 hours  pantoprazole    Tablet 40 milliGRAM(s) Oral before breakfast  sertraline 100 milliGRAM(s) Oral two times a day      Exam-  General: NAD, WDWN, appropriate affect  Cor: s1s2, IRR, no murmurs   Tele/EKG: Afib 60-80/  Pulm: Clear, no wheezes, rales, or rhonchi, no use of accessory muscles  Gastrointestinal: soft, nontender, nondistended, +bowel sounds  Extremities: no edema, good distal pulses  Groin: nontender, clean and dry, soft, no hematoma b/l  Neuro: A&Ox3, nonfocal                          12.4   10.62 )-----------( 249      ( 28 May 2021 07:01 )             38.6   05-28    139  |  106  |  14  ----------------------------<  113<H>  3.9   |  19<L>  |  0.72    Ca    9.2      28 May 2021 07:01    TPro  7.1  /  Alb  4.1  /  TBili  0.7  /  DBili  x   /  AST  31  /  ALT  25  /  AlkPhos  94  05-28  PT/INR - ( 27 May 2021 16:13 )   PT: 14.2 sec;   INR: 1.19 ratio         PTT - ( 27 May 2021 16:13 )  PTT:41.3 sec  I&O's Summary    27 May 2021 07:01  -  28 May 2021 07:00  --------------------------------------------------------  IN: 0 mL / OUT: 700 mL / NET: -700 mL    28 May 2021 07:01  -  28 May 2021 11:24  --------------------------------------------------------  IN: 220 mL / OUT: 0 mL / NET: 220 mL              Assessment/Plan:  Ms Oh is POD 1 s/p TAVR (#23 CV) for severe symptomatic AS  - post TAVR TTE to be done today  - asa/pradaxa  - daily EKG's  - ambulate as tolerated today  - likely d/c home tomorrow  -- will d/c home with an MCOT to monitor for post TAVR conduction delays and arrhythmias for 30 days    - Discharge plan: follow up with Dr. Lang in one week and follow up with Structural Heart Team in one month.  Echo will be done at 1 month follow up visit.  Plan discussed with patient     Michael Hahn, PA  [phone]

## 2021-05-28 NOTE — PROGRESS NOTE ADULT - SUBJECTIVE AND OBJECTIVE BOX
S: Denies chest pain or SOB. Review of systems otherwise (-)    Review of Systems:   Constitutional: [ ] fevers, [ ] chills.   Skin: [ ] dry skin. [ ] rashes.  Psychiatric: [ ] depression, [ ] anxiety.   Gastrointestinal: [ ] BRBPR, [ ] melena.   Neurological: [ ] confusion. [ ] seizures. [ ] shuffling gait.   Ears,Nose,Mouth and Throat: [ ] ear pain [ ] sore throat.   Eyes: [ ] diplopia.   Respiratory: [ ] hemoptysis. [ ] shortness of breath  Cardiovascular: See HPI above  Hematologic/Lymphatic: [ ] anemia. [ ] painful nodes. [ ] prolonged bleeding.   Genitourinary: [ ] hematuria. [ ] flank pain.   Endocrine: [ ] significant change in weight. [ ] intolerance to heat and cold.     Review of systems [ x] otherwise negative, [ ] otherwise unable to obtain    FH: no family history of sudden cardiac death in first degree relatives    SH: [ ] tobacco, [ ] alcohol, [ ] drugs    aspirin enteric coated 81 milliGRAM(s) Oral daily  atorvastatin 80 milliGRAM(s) Oral at bedtime  cefuroxime  IVPB 1500 milliGRAM(s) IV Intermittent once  dabigatran 150 milliGRAM(s) Oral every 12 hours  pantoprazole    Tablet 40 milliGRAM(s) Oral before breakfast  sertraline 100 milliGRAM(s) Oral two times a day                            12.4   10.62 )-----------( 249      ( 28 May 2021 07:01 )             38.6       05-28    139  |  106  |  14  ----------------------------<  113<H>  3.9   |  19<L>  |  0.72    Ca    9.2      28 May 2021 07:01    TPro  7.1  /  Alb  4.1  /  TBili  0.7  /  DBili  x   /  AST  31  /  ALT  25  /  AlkPhos  94  05-28            T(C): 36.3 (05-28-21 @ 12:16), Max: 36.9 (05-27-21 @ 21:06)  HR: 90 (05-28-21 @ 12:16) (54 - 90)  BP: 153/71 (05-28-21 @ 12:16) (103/54 - 153/71)  RR: 18 (05-28-21 @ 12:16) (14 - 18)  SpO2: 98% (05-28-21 @ 12:16) (95% - 100%)  Wt(kg): --    I&O's Summary    27 May 2021 07:01  -  28 May 2021 07:00  --------------------------------------------------------  IN: 0 mL / OUT: 700 mL / NET: -700 mL    28 May 2021 07:01  -  28 May 2021 15:37  --------------------------------------------------------  IN: 460 mL / OUT: 900 mL / NET: -440 mL      Gen: Appears well in NAD  HEENT:  (-)icterus (-)pallor  CV: N S1 S2 1/6 NAZ (+)2 Pulses B/l  Resp:  Clear to auscultation B/L, normal effort  GI: (+) BS Soft, NT, ND  Lymph:  (-)Edema, (-)obvious lymphadenopathy  Skin: Warm to touch, Normal turgor  Psych: Appropriate mood and affect    TELEMETRY: AF 60-80  	      ECG: Afib 66 bpm, anteroseptal infarct age undetermined      < from: Transthoracic Echocardiogram (05.28.21 @ 13:36) >  Conclusions:  1. Mitral annular calcification and calcified mitral  leaflets with normal diastolic opening. Mild mitral  regurgitation.  2. Transcatheter aortic valve replacement.  The valve is  well seated.  Peak transaortic valve gradient equals 23 mm  Hg, mean transaortic valve gradient equals 12 mm Hg, which  is probably normal in the presence of a transcatheter  aortic valve replacement. No aortic valvular or  paravalvular regurgitation seen.  3. Hyperdynamic left ventricular systolic function.  4. Normal right ventricular size and function.  *** Compared with echocardiogram of 5/27/2021, no  significant changes noted from the post procedure images.    < end of copied text >  	    RADIOLOGY:         CXR: < from: Xray Chest 2 Views PA/Lat (05.26.21 @ 13:12) >  IMPRESSION:    The heart is normal in size. Lungs are clear. No pleural effusion. No pneumothorax. No acute bony pathology could be identified.      < end of copied text >      ASSESSMENT/PLAN: Patient is a 89 y/o female well known to our office (Cardiologist - Dr. 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    - Monitor tele/EKG post op  - CTS/Structural heart follow up  - TTE noted above  - Will follow with you, post op care appreciated  - Patient to f/u in our office with Dr. Luna after d/c    Redd Tirado PA-C  Pager: [phone]       S: Denies chest pain or SOB. Review of systems otherwise (-)    Review of Systems:   Constitutional: [ ] fevers, [ ] chills.   Skin: [ ] dry skin. [ ] rashes.  Psychiatric: [ ] depression, [ ] anxiety.   Gastrointestinal: [ ] BRBPR, [ ] melena.   Neurological: [ ] confusion. [ ] seizures. [ ] shuffling gait.   Ears,Nose,Mouth and Throat: [ ] ear pain [ ] sore throat.   Eyes: [ ] diplopia.   Respiratory: [ ] hemoptysis. [ ] shortness of breath  Cardiovascular: See HPI above  Hematologic/Lymphatic: [ ] anemia. [ ] painful nodes. [ ] prolonged bleeding.   Genitourinary: [ ] hematuria. [ ] flank pain.   Endocrine: [ ] significant change in weight. [ ] intolerance to heat and cold.     Review of systems [ x] otherwise negative, [ ] otherwise unable to obtain    FH: no family history of sudden cardiac death in first degree relatives    SH: [ ] tobacco, [ ] alcohol, [ ] drugs    aspirin enteric coated 81 milliGRAM(s) Oral daily  atorvastatin 80 milliGRAM(s) Oral at bedtime  cefuroxime  IVPB 1500 milliGRAM(s) IV Intermittent once  dabigatran 150 milliGRAM(s) Oral every 12 hours  pantoprazole    Tablet 40 milliGRAM(s) Oral before breakfast  sertraline 100 milliGRAM(s) Oral two times a day                            12.4   10.62 )-----------( 249      ( 28 May 2021 07:01 )             38.6       05-28    139  |  106  |  14  ----------------------------<  113<H>  3.9   |  19<L>  |  0.72    Ca    9.2      28 May 2021 07:01    TPro  7.1  /  Alb  4.1  /  TBili  0.7  /  DBili  x   /  AST  31  /  ALT  25  /  AlkPhos  94  05-28            T(C): 36.3 (05-28-21 @ 12:16), Max: 36.9 (05-27-21 @ 21:06)  HR: 90 (05-28-21 @ 12:16) (54 - 90)  BP: 153/71 (05-28-21 @ 12:16) (103/54 - 153/71)  RR: 18 (05-28-21 @ 12:16) (14 - 18)  SpO2: 98% (05-28-21 @ 12:16) (95% - 100%)  Wt(kg): --    I&O's Summary    27 May 2021 07:01  -  28 May 2021 07:00  --------------------------------------------------------  IN: 0 mL / OUT: 700 mL / NET: -700 mL    28 May 2021 07:01  -  28 May 2021 15:37  --------------------------------------------------------  IN: 460 mL / OUT: 900 mL / NET: -440 mL      Gen: Appears well in NAD  HEENT:  (-)icterus (-)pallor  CV: N S1 S2 1/6 NAZ (+)2 Pulses B/l  Resp:  Clear to auscultation B/L, normal effort  GI: (+) BS Soft, NT, ND  Lymph:  (-)Edema, (-)obvious lymphadenopathy  Skin: Warm to touch, Normal turgor  Psych: Appropriate mood and affect    TELEMETRY: AF 60-80  	      ECG: Afib 66 bpm, anteroseptal infarct age undetermined      < from: Transthoracic Echocardiogram (05.28.21 @ 13:36) >  Conclusions:  1. Mitral annular calcification and calcified mitral  leaflets with normal diastolic opening. Mild mitral  regurgitation.  2. Transcatheter aortic valve replacement.  The valve is  well seated.  Peak transaortic valve gradient equals 23 mm  Hg, mean transaortic valve gradient equals 12 mm Hg, which  is probably normal in the presence of a transcatheter  aortic valve replacement. No aortic valvular or  paravalvular regurgitation seen.  3. Hyperdynamic left ventricular systolic function.  4. Normal right ventricular size and function.  *** Compared with echocardiogram of 5/27/2021, no  significant changes noted from the post procedure images.    < end of copied text >  	    RADIOLOGY:         CXR: < from: Xray Chest 2 Views PA/Lat (05.26.21 @ 13:12) >  IMPRESSION:    The heart is normal in size. Lungs are clear. No pleural effusion. No pneumothorax. No acute bony pathology could be identified.      < end of copied text >      ASSESSMENT/PLAN: Patient is a 89 y/o female well known to our office (Cardiologist - Dr. 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    - Monitor tele/EKG post op  - CTS/Structural heart follow up  - TTE noted above  - AC with pradaxa resumed per CTS  - Will follow with you, post op care appreciated  - Patient to f/u in our office with Dr. Luna after d/c    Redd Tirado PA-C  Pager: [phone]

## 2021-05-28 NOTE — PROGRESS NOTE ADULT - SUBJECTIVE AND OBJECTIVE BOX
VITAL SIGNS    Telemetry:    Vital Signs Last 24 Hrs  T(C): 36.9 (21 @ 05:27), Max: 36.9 (21 @ 21:06)  T(F): 98.4 (21 @ 05:27), Max: 98.4 (21 @ 21:06)  HR: 84 (21 @ 05:27) (54 - 91)  BP: 151/90 (21 @ 05:27) (95/51 - 151/90)  RR: 18 (21 @ 05:27) (14 - 18)  SpO2: 96% (21 @ 05:27) (95% - 100%)             @ 07:01  -   @ 07:00  --------------------------------------------------------  IN: 0 mL / OUT: 700 mL / NET: -700 mL       Daily Height in cm: 149.86 (27 May 2021 13:00)    Daily Weight in k (28 May 2021 07:08)  Admit Wt: Drug Dosing Weight  Height (cm): 149.9 (27 May 2021 13:00)  Weight (kg): 63.1 (27 May 2021 13:00)  BMI (kg/m2): 28.1 (27 May 2021 13:00)  BSA (m2): 1.58 (27 May 2021 13:00)     Daily Weight in k (21 @ 07:08)    LABS      139  |  106  |  14  ----------------------------<  113<H>  3.9   |  19<L>  |  0.72    Ca    9.2      28 May 2021 07:01    TPro  7.1  /  Alb  4.1  /  TBili  0.7  /  DBili  x   /  AST  31  /  ALT  25  /  AlkPhos  94                                   12.4   10.62 )-----------( 249      ( 28 May 2021 07:01 )             38.6          PT/INR - ( 27 May 2021 16:13 )   PT: 14.2 sec;   INR: 1.19 ratio         PTT - ( 27 May 2021 16:13 )  PTT:41.3 sec        Bilirubin Total, Serum: 0.7 mg/dL ( @ 07:01)  Bilirubin Total, Serum: 0.5 mg/dL ( @ 15:43)    CAPILLARY BLOOD GLUCOSE      POCT Blood Glucose.: 104 mg/dL (27 May 2021 10:04)          Drains:     MS       [  ]   [  ]            L Pleural [  ]            R Pleural  [  ]            LIS  [  ]           [place]  [  ]    Pacing Wires      [  ]   Settings:                                  Isolated  [  ]                    CXR:      MEDICATIONS  aspirin enteric coated 81 milliGRAM(s) Oral daily  atorvastatin 80 milliGRAM(s) Oral at bedtime  cefuroxime  IVPB 1500 milliGRAM(s) IV Intermittent once  dabigatran 150 milliGRAM(s) Oral every 12 hours  pantoprazole    Tablet 40 milliGRAM(s) Oral before breakfast  sertraline 100 milliGRAM(s) Oral two times a day      PHYSICAL EXAM      Neurology: alert and oriented x 3, nonfocal, no gross deficits  CV :S1S2  Sternal Wound :  CDI , Stable  Lungs: cta  Abdomen: soft, nontender, nondistended, positive bowel sounds, last bowel movement   :     voids  Extremities:   b/l groins stable without hematoma, b/l Lower extrem. warm to touch with good sensation +pedal pulses b/l               PAST MEDICAL & SURGICAL HISTORY:  HTN (Hypertension)    Hyperlipidemia    Tremor, Essential    Atrial fibrillation  on Pradaxa    GERD (gastroesophageal reflux disease)    CVA (cerebral vascular accident)  , aphasia    Lumbar stenosis    Skin cancer  basal, treated surgically    Arthritis    Nonrheumatic aortic valve stenosis    S/P rotator cuff repair  right    History of D&amp;C    S/P left cataract extraction  B/L    H/O arthroscopy  joint replacement b/l 1MCP b/l    H/O total knee replacement, right    History of tonsillectomy                   Discussed with Cardiothoracic Team at AM rounds.

## 2021-05-28 NOTE — PROGRESS NOTE ADULT - ASSESSMENT
Patient seen and examined, agree with above assessment and plan as transcribed above.    - progressing well  - structural heart f/u appreciated    Robi Sheldon MD, [facility]  BEEPER [phone]

## 2021-05-28 NOTE — PROGRESS NOTE ADULT - PROBLEM SELECTOR PLAN 1
conscious sedation   [id number]  pradaxa in AM - ck b/l groins  TTE in AM   EKG daily
conscious sedation   asa81  pradaxa in AM - ck b/l groins  TTE in AM   EKG daily

## 2021-05-28 NOTE — PROGRESS NOTE ADULT - ASSESSMENT
89 y.o. female PMHx: CVA (w/ aphasia and right sided weakness), HTN, HLD, GERD, AS and AFIB on Pradaxa. Now for planned TAVR.     5/27 s/p TAVR 23 Evolute pro, Transfemoral approach, conscious sedation   5/28: Restarted Pradaxa at home dose. Likley discharge on SAt. Await echo today

## 2021-05-29 ENCOUNTER — TRANSCRIPTION ENCOUNTER (OUTPATIENT)
Age: 86
End: 2021-05-29

## 2021-05-29 VITALS
DIASTOLIC BLOOD PRESSURE: 84 MMHG | SYSTOLIC BLOOD PRESSURE: 133 MMHG | HEART RATE: 103 BPM | OXYGEN SATURATION: 95 % | RESPIRATION RATE: 18 BRPM

## 2021-05-29 LAB
ANION GAP SERPL CALC-SCNC: 15 MMOL/L — SIGNIFICANT CHANGE UP (ref 5–17)
BUN SERPL-MCNC: 17 MG/DL — SIGNIFICANT CHANGE UP (ref 7–23)
CALCIUM SERPL-MCNC: 9.2 MG/DL — SIGNIFICANT CHANGE UP (ref 8.4–10.5)
CHLORIDE SERPL-SCNC: 106 MMOL/L — SIGNIFICANT CHANGE UP (ref 96–108)
CO2 SERPL-SCNC: 18 MMOL/L — LOW (ref 22–31)
CREAT SERPL-MCNC: 0.71 MG/DL — SIGNIFICANT CHANGE UP (ref 0.5–1.3)
GLUCOSE SERPL-MCNC: 100 MG/DL — HIGH (ref 70–99)
HCT VFR BLD CALC: 39.7 % — SIGNIFICANT CHANGE UP (ref 34.5–45)
HGB BLD-MCNC: 12.9 G/DL — SIGNIFICANT CHANGE UP (ref 11.5–15.5)
MCHC RBC-ENTMCNC: 29.5 PG — SIGNIFICANT CHANGE UP (ref 27–34)
MCHC RBC-ENTMCNC: 32.5 GM/DL — SIGNIFICANT CHANGE UP (ref 32–36)
MCV RBC AUTO: 90.8 FL — SIGNIFICANT CHANGE UP (ref 80–100)
NRBC # BLD: 0 /100 WBCS — SIGNIFICANT CHANGE UP (ref 0–0)
PLATELET # BLD AUTO: 254 K/UL — SIGNIFICANT CHANGE UP (ref 150–400)
POTASSIUM SERPL-MCNC: 3.6 MMOL/L — SIGNIFICANT CHANGE UP (ref 3.5–5.3)
POTASSIUM SERPL-SCNC: 3.6 MMOL/L — SIGNIFICANT CHANGE UP (ref 3.5–5.3)
RBC # BLD: 4.37 M/UL — SIGNIFICANT CHANGE UP (ref 3.8–5.2)
RBC # FLD: 13.8 % — SIGNIFICANT CHANGE UP (ref 10.3–14.5)
SODIUM SERPL-SCNC: 139 MMOL/L — SIGNIFICANT CHANGE UP (ref 135–145)
WBC # BLD: 10.48 K/UL — SIGNIFICANT CHANGE UP (ref 3.8–10.5)
WBC # FLD AUTO: 10.48 K/UL — SIGNIFICANT CHANGE UP (ref 3.8–10.5)

## 2021-05-29 PROCEDURE — C1889: CPT

## 2021-05-29 PROCEDURE — 86900 BLOOD TYPING SEROLOGIC ABO: CPT

## 2021-05-29 PROCEDURE — C1894: CPT

## 2021-05-29 PROCEDURE — C1887: CPT

## 2021-05-29 PROCEDURE — C1884: CPT

## 2021-05-29 PROCEDURE — 80048 BASIC METABOLIC PNL TOTAL CA: CPT

## 2021-05-29 PROCEDURE — 87641 MR-STAPH DNA AMP PROBE: CPT

## 2021-05-29 PROCEDURE — C1769: CPT

## 2021-05-29 PROCEDURE — 71046 X-RAY EXAM CHEST 2 VIEWS: CPT

## 2021-05-29 PROCEDURE — 82962 GLUCOSE BLOOD TEST: CPT

## 2021-05-29 PROCEDURE — U0003: CPT

## 2021-05-29 PROCEDURE — 86901 BLOOD TYPING SEROLOGIC RH(D): CPT

## 2021-05-29 PROCEDURE — C9803: CPT

## 2021-05-29 PROCEDURE — 85027 COMPLETE CBC AUTOMATED: CPT

## 2021-05-29 PROCEDURE — 93880 EXTRACRANIAL BILAT STUDY: CPT

## 2021-05-29 PROCEDURE — 85610 PROTHROMBIN TIME: CPT

## 2021-05-29 PROCEDURE — 80053 COMPREHEN METABOLIC PANEL: CPT

## 2021-05-29 PROCEDURE — G0463: CPT

## 2021-05-29 PROCEDURE — 85730 THROMBOPLASTIN TIME PARTIAL: CPT

## 2021-05-29 PROCEDURE — 83036 HEMOGLOBIN GLYCOSYLATED A1C: CPT

## 2021-05-29 PROCEDURE — 86850 RBC ANTIBODY SCREEN: CPT

## 2021-05-29 PROCEDURE — 93010 ELECTROCARDIOGRAM REPORT: CPT

## 2021-05-29 PROCEDURE — 76000 FLUOROSCOPY <1 HR PHYS/QHP: CPT

## 2021-05-29 PROCEDURE — 99239 HOSP IP/OBS DSCHRG MGMT >30: CPT

## 2021-05-29 PROCEDURE — 86923 COMPATIBILITY TEST ELECTRIC: CPT

## 2021-05-29 PROCEDURE — 87640 STAPH A DNA AMP PROBE: CPT

## 2021-05-29 PROCEDURE — 93306 TTE W/DOPPLER COMPLETE: CPT

## 2021-05-29 PROCEDURE — 93005 ELECTROCARDIOGRAM TRACING: CPT

## 2021-05-29 PROCEDURE — C1760: CPT

## 2021-05-29 PROCEDURE — U0005: CPT

## 2021-05-29 RX ORDER — DILTIAZEM HCL 120 MG
300 CAPSULE, EXT RELEASE 24 HR ORAL DAILY
Refills: 0 | Status: DISCONTINUED | OUTPATIENT
Start: 2021-05-29 | End: 2021-05-29

## 2021-05-29 RX ORDER — DIGOXIN 250 MCG
1 TABLET ORAL
Qty: 0 | Refills: 0 | DISCHARGE

## 2021-05-29 RX ORDER — POTASSIUM CHLORIDE 20 MEQ
20 PACKET (EA) ORAL ONCE
Refills: 0 | Status: COMPLETED | OUTPATIENT
Start: 2021-05-29 | End: 2021-05-29

## 2021-05-29 RX ORDER — ASPIRIN/CALCIUM CARB/MAGNESIUM 324 MG
1 TABLET ORAL
Qty: 30 | Refills: 0
Start: 2021-05-29 | End: 2021-06-27

## 2021-05-29 RX ADMIN — Medication 20 MILLIEQUIVALENT(S): at 11:56

## 2021-05-29 RX ADMIN — Medication 300 MILLIGRAM(S): at 11:56

## 2021-05-29 RX ADMIN — SERTRALINE 100 MILLIGRAM(S): 25 TABLET, FILM COATED ORAL at 05:09

## 2021-05-29 RX ADMIN — DABIGATRAN ETEXILATE MESYLATE 150 MILLIGRAM(S): 150 CAPSULE ORAL at 05:10

## 2021-05-29 RX ADMIN — Medication 81 MILLIGRAM(S): at 11:56

## 2021-05-29 RX ADMIN — PANTOPRAZOLE SODIUM 40 MILLIGRAM(S): 20 TABLET, DELAYED RELEASE ORAL at 05:09

## 2021-05-29 NOTE — DISCHARGE NOTE NURSING/CASE MANAGEMENT/SOCIAL WORK - PATIENT PORTAL LINK FT
You can access the FollowMyHealth Patient Portal offered by [facility] by registering at the following website: [email or web address]. By joining [facility]’s FollowMyHealth portal, you will also be able to view your health information using other applications (apps) compatible with our system.

## 2021-05-29 NOTE — DISCHARGE NOTE PROVIDER - NSDCFUADDINST_GEN_ALL_CORE_FT
***** Notify MD and Dentist the need of antibiotic prophylaxis before any dental procedure to prevent infection of your new valve. *****

## 2021-05-29 NOTE — DISCHARGE NOTE PROVIDER - HOSPITAL COURSE
89 y.o. female PMHx: CVA (w/ aphasia and right sided weakness), HTN, HLD, GERD, AS and AFIB on Pradaxa. Now for planned TAVR.     5/27 s/p TAVR 23 Evolute pro, Transfemoral approach, conscious sedation   5/28: Restarted Pradaxa at home dose. Likely discharge on tomorrow with MCOT. Post op echo revealed: Transcatheter aortic valve replacement. The valve is well seated.  Peak transaortic valve gradient  equals 23 mm Hg, mean transaortic valve gradient equals 12 mm Hg, which is probably normal in the presence of a transcatheter aortic valve replacement. No aortic valvular or paravalvular regurgitation seen. Hyperdynamic left ventricular systolic function. Normal pericardium with trace pericardial effusion.  5/29  --> Restarted on Cardizem 300 mg ER daily per Dr. Todd; Medically cleared by Dr. Lang for discharge home today with MCOT. Tele Tech at bedside to place MCOT, instructions provided using teach back method.  Patient discharged home.

## 2021-05-29 NOTE — DISCHARGE NOTE PROVIDER - PROVIDER TOKENS
PROVIDER:[TOKEN:[[id number]],FOLLOWUP:[1 week]],PROVIDER:[TOKEN:[[id number]],FOLLOWUP:[1 month]],PROVIDER:[TOKEN:[[id number]],FOLLOWUP:[1 week]]

## 2021-05-29 NOTE — DISCHARGE NOTE PROVIDER - NSDCFUADDAPPT_GEN_ALL_CORE_FT
1. Please schedule an appointment with Dr. Lang in 7 days for a post op follow up visit, please call the office to schedule an appointment at [phone].   2. Please schedule an appointment with your PCP in 1 week, call the office to schedule an appointment.   3. Please schedule an appointment with your Cardiologist in 1-2 weeks, please call the office to schedule an appointment.   4. Follow up with Structural Team in 4 weeks post TAVR procedure at the [facility] office for a post op follow up visit and a repeat echocardiogram at the time of the visit.  Please call the [facility] office to schedule an appointment at [phone].

## 2021-05-29 NOTE — DISCHARGE NOTE PROVIDER - NSDCMRMEDTOKEN_GEN_ALL_CORE_FT
aspirin 81 mg oral delayed release tablet: 1 tab(s) orally once a day  atorvastatin 40 mg oral tablet: 1 tab(s) orally once a day  dabigatran 150 mg oral capsule: 1 cap(s) orally every 12 hours.  last dose taken yesterday 5/25/21 as per pt  dilTIAZem 300 mg/24 hours oral capsule, extended release: 1 cap(s) orally once a day  Multiple Vitamins oral tablet: 1 tab(s) orally once a day  omeprazole 20 mg oral delayed release capsule: 1 cap(s) orally 2 times a day  PreserVision AREDS 2 oral capsule: 1 cap(s) orally once a day  sertraline 100 mg oral tablet: 1 tab(s) orally 2 times a day

## 2021-05-29 NOTE — DISCHARGE NOTE PROVIDER - NSDCFUSCHEDAPPT_GEN_ALL_CORE_FT
AMINA GLASER ; 06/09/2021 ; [facility]  AMINA GLASER ; 08/25/2021 ; [facility]

## 2021-05-29 NOTE — DISCHARGE NOTE PROVIDER - CARE PROVIDER_API CALL
Asif Lang)  Thoracic and Cardiac Surgery  [address] [place] [address]  Phone: [phone]  Fax: [phone]  Follow Up Time: 1 week    Mauricio Todd)  Interventional Cardiology  [address] [place] [address]  Phone: [phone]  Fax: [phone]  Follow Up Time: 1 month    Sy Laboy  INTERNAL MEDICINE  [address] [facility], [address]  [place] [address]  Phone: [phone]  Fax: [phone]  Follow Up Time: 1 week

## 2021-05-29 NOTE — DISCHARGE NOTE PROVIDER - CARE PROVIDERS DIRECT ADDRESSES
[email or web address]

## 2021-05-29 NOTE — DISCHARGE NOTE PROVIDER - NSDCPNSUBOBJ_GEN_ALL_CORE
VITAL SIGNS    Subjective: Denies CP, palpitation, SOB, WINSTON, HA, dizziness, N/V/D, fever or chills.  No acute event noted overnight.     Telemetry: Afib       Vital Signs Last 24 Hrs  T(C): 36.6 (05-29-21 @ 05:29), Max: 37.1 (05-28-21 @ 20:04)  T(F): 97.9 (05-29-21 @ 05:29), Max: 98.8 (05-28-21 @ 20:04)  HR: 103 (05-29-21 @ 12:00) (78 - 103)  BP: 133/84 (05-29-21 @ 12:00) (133/84 - 165/82)  RR: 18 (05-29-21 @ 12:00) (18 - 18)  SpO2: 95% (05-29-21 @ 12:00) (95% - 96%)           05-28 @ 07:01  -  05-29 @ 07:00  --------------------------------------------------------  IN: 1010 mL / OUT: 1400 mL / NET: -390 mL    05-29 @ 07:01  -  05-29 @ 13:39  --------------------------------------------------------  IN: 240 mL / OUT: 0 mL / NET: 240 mL    PHYSICAL EXAM    Neurology: alert and oriented x 3, nonfocal, no gross deficits    CV: (+) Irregular S1 and S2, No murmurs, rubs, gallops or clicks     Lungs: CTA B/L     Abdomen: soft, nontender, nondistended, positive bowel sounds, (+) Flatus; (+) BM     :  Voiding               Extremities:  B/L LE (+) trace edema; negative calf tenderness; (+) 2 DP palpable        aspirin enteric coated 81 milliGRAM(s) Oral daily  atorvastatin 80 milliGRAM(s) Oral at bedtime  cefuroxime  IVPB 1500 milliGRAM(s) IV Intermittent once  dabigatran 150 milliGRAM(s) Oral every 12 hours  diltiazem   milliGRAM(s) Oral daily  pantoprazole  Tablet 40 milliGRAM(s) Oral before breakfast  sertraline 100 milliGRAM(s) Oral two times a day      Physical Therapy Rec:   Home  [ X ]   Home w/ PT  [  ]  Rehab  [  ]    Discussed with Cardiothoracic Team at AM rounds.    Spent 45 min face to face encounter with patient and discharge note.

## 2021-05-29 NOTE — PROGRESS NOTE ADULT - SUBJECTIVE AND OBJECTIVE BOX
pt seen and examined, no complaints, ROS - .          aspirin enteric coated 81 milliGRAM(s) Oral daily  atorvastatin 80 milliGRAM(s) Oral at bedtime  cefuroxime  IVPB 1500 milliGRAM(s) IV Intermittent once  dabigatran 150 milliGRAM(s) Oral every 12 hours  pantoprazole    Tablet 40 milliGRAM(s) Oral before breakfast  sertraline 100 milliGRAM(s) Oral two times a day                            12.9   10.48 )-----------( 254      ( 29 May 2021 06:36 )             39.7       Hemoglobin: 12.9 g/dL (05-29 @ 06:36)  Hemoglobin: 12.4 g/dL (05-28 @ 07:01)  Hemoglobin: 10.8 g/dL (05-27 @ 16:29)  Hemoglobin: 12.9 g/dL (05-26 @ 12:39)      05-29    139  |  106  |  17  ----------------------------<  100<H>  3.6   |  18<L>  |  0.71    Ca    9.2      29 May 2021 06:36    TPro  7.1  /  Alb  4.1  /  TBili  0.7  /  DBili  x   /  AST  31  /  ALT  25  /  AlkPhos  94  05-28    Creatinine Trend: 0.71<--, 0.72<--, 0.84<--, 0.81<--, 0.87<--    COAGS:           T(C): 36.6 (05-29-21 @ 05:29), Max: 37.1 (05-28-21 @ 20:04)  HR: 78 (05-29-21 @ 05:29) (78 - 93)  BP: 165/82 (05-29-21 @ 05:29) (144/83 - 165/82)  RR: 18 (05-29-21 @ 05:29) (18 - 18)  SpO2: 95% (05-29-21 @ 05:29) (95% - 98%)  Wt(kg): --    I&O's Summary    28 May 2021 07:01  -  29 May 2021 07:00  --------------------------------------------------------  IN: 1010 mL / OUT: 1400 mL / NET: -390 mL      Gen: Appears well in NAD  HEENT:  (-)icterus (-)pallor  CV: N S1 S2 1/6 NAZ (+)2 Pulses B/l  Resp:  Clear to auscultation B/L, normal effort  GI: (+) BS Soft, NT, ND  Lymph:  (-)Edema, (-)obvious lymphadenopathy  Skin: Warm to touch, Normal turgor  Psych: Appropriate mood and affect    TELEMETRY: AF 60-80  	      ECG: Afib 66 bpm, anteroseptal infarct age undetermined      < from: Transthoracic Echocardiogram (05.28.21 @ 13:36) >  Conclusions:  1. Mitral annular calcification and calcified mitral  leaflets with normal diastolic opening. Mild mitral  regurgitation.  2. Transcatheter aortic valve replacement.  The valve is  well seated.  Peak transaortic valve gradient equals 23 mm  Hg, mean transaortic valve gradient equals 12 mm Hg, which  is probably normal in the presence of a transcatheter  aortic valve replacement. No aortic valvular or  paravalvular regurgitation seen.  3. Hyperdynamic left ventricular systolic function.  4. Normal right ventricular size and function.  *** Compared with echocardiogram of 5/27/2021, no  significant changes noted from the post procedure images.    < end of copied text >  	    RADIOLOGY:         CXR: < from: Xray Chest 2 Views PA/Lat (05.26.21 @ 13:12) >  IMPRESSION:    The heart is normal in size. Lungs are clear. No pleural effusion. No pneumothorax. No acute bony pathology could be identified.      < end of copied text >      ASSESSMENT/PLAN: Patient is a 88 y/o female well known to our office (Cardiologist - Dr. 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    - Monitor tele/EKG post op  - CTS/Structural heart follow up  - TTE noted above  - AC with pradaxa resumed per CTS  - Will follow with you, post op care appreciated  - Patient to f/u in our office with Dr. Luna after d/c

## 2021-05-29 NOTE — PROGRESS NOTE ADULT - ASSESSMENT
Patient seen and examined, agree with above assessment and plan as transcribed above.    - progressing well  - structural heart f/u appreciated    Robi Sheldon MD, [facility]  BEEPER [phone]   Agree with above  S/p TAVR  Lifelong ac if no contraindications - restarted by structural heart  DC planning per primary team    Cain Gonzalez MD

## 2021-05-29 NOTE — DISCHARGE NOTE PROVIDER - NSDCCPCAREPLAN_GEN_ALL_CORE_FT
PRINCIPAL DISCHARGE DIAGNOSIS  Diagnosis: S/p TAVR (transcatheter aortic valve replacement), bioprosthetic  Assessment and Plan of Treatment:   1. Daily Shower   2. Weight yourself daily and notify any weight gain greater than 2-3 pounds in 24 hours.  3. Regular DASH diet - low fat, low cholesterol, no added salt.  4. Notify MD and Dentist the need of antibiotic prophylaxis before any dental procedure to prevent infection of your new valve.   5. Follow Post op TAVR Do's and Don'ts discharge instructions.   6. No heavy lifting or straining x 2 day.   7. Call / Notify MD any fever greater than 101.0  8. Increase Activity as tolerated.   9. Check your groin site for bleeding and/or swelling daily following procedure and call your doctor immediately if it occurs or if you experience increased pain at the site.  10. Resume home medication as prescribed and instructed in discharge paperwork.

## 2021-05-30 ENCOUNTER — TRANSCRIPTION ENCOUNTER (OUTPATIENT)
Age: 86
End: 2021-05-30

## 2021-06-01 ENCOUNTER — NON-APPOINTMENT (OUTPATIENT)
Age: 86
End: 2021-06-01

## 2021-06-01 PROBLEM — I35.0 NONRHEUMATIC AORTIC (VALVE) STENOSIS: Chronic | Status: ACTIVE | Noted: 2021-05-26

## 2021-06-02 ENCOUNTER — APPOINTMENT (OUTPATIENT)
Dept: CARDIOTHORACIC SURGERY | Facility: CLINIC | Age: 86
End: 2021-06-02
Payer: MEDICARE

## 2021-06-02 VITALS
SYSTOLIC BLOOD PRESSURE: 148 MMHG | RESPIRATION RATE: 13 BRPM | TEMPERATURE: 98 F | DIASTOLIC BLOOD PRESSURE: 83 MMHG | HEART RATE: 79 BPM | OXYGEN SATURATION: 97 %

## 2021-06-02 VITALS — BODY MASS INDEX: 27.82 KG/M2 | HEIGHT: 59 IN | WEIGHT: 138 LBS

## 2021-06-02 PROCEDURE — 99211 OFF/OP EST MAY X REQ PHY/QHP: CPT

## 2021-06-04 ENCOUNTER — NON-APPOINTMENT (OUTPATIENT)
Age: 86
End: 2021-06-04

## 2021-06-08 ENCOUNTER — APPOINTMENT (OUTPATIENT)
Dept: CARE COORDINATION | Facility: HOME HEALTH | Age: 86
End: 2021-06-08

## 2021-06-08 VITALS
RESPIRATION RATE: 16 BRPM | DIASTOLIC BLOOD PRESSURE: 80 MMHG | SYSTOLIC BLOOD PRESSURE: 140 MMHG | HEART RATE: 78 BPM | OXYGEN SATURATION: 98 %

## 2021-06-08 RX ORDER — SERTRALINE HYDROCHLORIDE 100 MG/1
100 TABLET, FILM COATED ORAL
Qty: 30 | Refills: 1 | Status: ACTIVE | COMMUNITY
Start: 2017-06-30

## 2021-06-08 NOTE — HISTORY OF PRESENT ILLNESS
[FreeTextEntry1] : FOLLOW YOUR HEART HOME VISIT-[facility]\par Home Visit made Kendra GLASER ] . A  patient of Dr Marc López  S/P TAVR  on 5/27. Discharged from [facility]\par \par \par Visiting patient for post discharge transitional care management and post surgical follow up. \par Pt appears well, she is accompanied by her  and official care taker.  She is in no distress \par \par

## 2021-06-08 NOTE — PHYSICAL EXAM
[Sclera] : the sclera and conjunctiva were normal [PERRL With Normal Accommodation] : pupils were equal in size, round, and reactive to light [Neck Appearance] : the appearance of the neck was normal [] : the neck was supple [Auscultation Breath Sounds / Voice Sounds] : lungs were clear to auscultation bilaterally [Apical Impulse] : the apical impulse was normal [Heart Sounds] : normal S1 and S2 [Irregularly Irregular] : the rhythm was irregularly irregular [1+] : left 1+ [Bowel Sounds] : normal bowel sounds [Abdomen Soft] : soft [No CVA Tenderness] : no ~M costovertebral angle tenderness [Skin Color & Pigmentation] : normal skin color and pigmentation [No Focal Deficits] : no focal deficits [Oriented To Time, Place, And Person] : oriented to person, place, and time [FreeTextEntry1] : groin intact

## 2021-06-08 NOTE — ASSESSMENT
[FreeTextEntry1] : 89 y.o. female PMHx: CVA (w/ aphasia and right sided weakness), HTN, HLD, GERD, \par AS and AFIB on Pradaxa.

## 2021-06-09 ENCOUNTER — NON-APPOINTMENT (OUTPATIENT)
Age: 86
End: 2021-06-09

## 2021-06-09 ENCOUNTER — APPOINTMENT (OUTPATIENT)
Dept: CARDIOLOGY | Facility: CLINIC | Age: 86
End: 2021-06-09
Payer: MEDICARE

## 2021-06-09 VITALS
OXYGEN SATURATION: 98 % | HEART RATE: 95 BPM | SYSTOLIC BLOOD PRESSURE: 102 MMHG | DIASTOLIC BLOOD PRESSURE: 78 MMHG | WEIGHT: 140 LBS | BODY MASS INDEX: 28.28 KG/M2

## 2021-06-09 PROCEDURE — 99214 OFFICE O/P EST MOD 30 MIN: CPT

## 2021-06-09 PROCEDURE — 93000 ELECTROCARDIOGRAM COMPLETE: CPT

## 2021-06-09 NOTE — HISTORY OF PRESENT ILLNESS
[FreeTextEntry1] : Mrs. Oh, accompanied by her , presents in follow-up of her 5/27 TAVR with a #23 mm EVOLUTE pro valve via transfemoral approach.  Her course was uncomplicated and she was discharged after 2 days.  Echo on 5/28 revealed peak and mean transprosthetic gradients of 23 and 12 mmHg respectively without evidence of aortic insufficiency.  Left ventricle was hyperdynamic and the PASP was 46 mmHg.\par \par He feels well since discharge.  Groin insertion sites have healed well.  She is ambulating comfortably with a walker without any effort provoked symptoms.  Still porting an MCOT.\par \par No c/o chest, throat,jaw, arm or upper back discomfort.  No dyspnea, orthopnea or PND.  No palpitations, dizziness or syncope.  No edema or claudication.

## 2021-06-10 ENCOUNTER — APPOINTMENT (OUTPATIENT)
Dept: INTERNAL MEDICINE | Facility: CLINIC | Age: 86
End: 2021-06-10
Payer: MEDICARE

## 2021-06-10 VITALS — HEART RATE: 90 BPM | RESPIRATION RATE: 14 BRPM | DIASTOLIC BLOOD PRESSURE: 76 MMHG | SYSTOLIC BLOOD PRESSURE: 110 MMHG

## 2021-06-10 VITALS — OXYGEN SATURATION: 99 % | HEIGHT: 59 IN | HEART RATE: 104 BPM | TEMPERATURE: 96.8 F

## 2021-06-10 PROCEDURE — 99495 TRANSJ CARE MGMT MOD F2F 14D: CPT

## 2021-06-10 RX ORDER — DILTIAZEM HYDROCHLORIDE 60 MG/1
60 CAPSULE, EXTENDED RELEASE ORAL
Qty: 60 | Refills: 0 | Status: DISCONTINUED | COMMUNITY
Start: 2021-04-27 | End: 2021-06-10

## 2021-06-10 RX ORDER — DILTIAZEM HYDROCHLORIDE 240 MG/1
240 CAPSULE, EXTENDED RELEASE ORAL
Qty: 90 | Refills: 0 | Status: COMPLETED | COMMUNITY
Start: 2020-12-26

## 2021-06-10 RX ORDER — DILTIAZEM HYDROCHLORIDE 240 MG/1
240 CAPSULE, EXTENDED RELEASE ORAL
Refills: 0 | Status: DISCONTINUED | COMMUNITY
Start: 2017-06-30 | End: 2021-06-10

## 2021-06-10 NOTE — HISTORY OF PRESENT ILLNESS
[Post-hospitalization from ___ Hospital] : Post-hospitalization from [unfilled] Hospital [Admitted on: ___] : The patient was admitted on [unfilled] [Discharged on ___] : discharged on [unfilled] [Discharge Summary] : discharge summary [Discharge Med List] : discharge medication list [Patient Contacted By: ____] : and contacted by [unfilled] [FreeTextEntry2] : 89 y.o. female PMHx: CVA (w/ aphasia and right sided weakness), HTN, HLD, GERD,\par AS and AFIB on Pradaxa. Now for planned TAVR.\par \par 5/27 s/p TAVR 23 Evolute pro, Transfemoral approach, conscious sedation\par 5/28: Restarted Pradaxa at home dose. Likely discharge on tomorrow with MCOT.\par Post op echo revealed: Transcatheter aortic valve replacement. The valve is\par well seated. Peak transaortic valve gradient\par equals 23 mm Hg, mean transaortic valve gradient equals 12 mm Hg, which is\par probably normal in the presence of a transcatheter aortic valve replacement. No\par aortic valvular or paravalvular regurgitation seen. Hyperdynamic left\par ventricular systolic function. Normal pericardium with trace pericardial\par effusion.\par 5/29  --> Restarted on Cardizem 300 mg ER daily per Dr. Todd;\par Medically cleared by Dr. Lang for discharge home today with MCOT. Tele Tech\par at bedside to place MCOT, instructions provided using teach back method.\par Patient discharged home.\par \par \par Pt doing well postop - no pain.\par ambulating well. NO CP/SOB/WINSTON\par Has MOCT in place\par No LE edema. NO abdominal pain/ N/V/ dizziness/syncope\par \par \par Med Reconciliation:\par Override IMPROVE-DD recommendations due to: This is a surgical and/or\par non-medical patient.\par Recommended Post-Discharge VTE Prophylaxis This is a surgical and/or\par non-medical patient.\par Medication Reconciliation Status Admission Reconciliation is Completed\par Discharge Reconciliation is Completed\par Discharge Medications aspirin 81 mg oral delayed release tablet: 1 tab(s)\par orally once a day\par atorvastatin 40 mg oral tablet: 1 tab(s) orally once a day\par dabigatran 150 mg oral capsule: 1 cap(s) orally every 12 hours.\par last dose taken yesterday 5/25/21 as per pt\par dilTIAZem 300 mg/24 hours oral capsule, extended release: 1 cap(s) orally once\par a day\par Multiple Vitamins oral tablet: 1 tab(s) orally once a day\par omeprazole 20 mg oral delayed release capsule: 1 cap(s) orally 2 times a day\par PreserVision AREDS 2 oral capsule: 1 cap(s) orally once a day\par sertraline 100 mg oral tablet: 1 tab(s) orally 2 times a day\par ,\par ,\par \par Care Plan/Procedures:\par Discharge Diagnoses, Assessment and Plan of Treatment PRINCIPAL DISCHARGE\par DIAGNOSIS\par Diagnosis: S/p TAVR (transcatheter aortic valve replacement), bioprosthetic\par Assessment and Plan of Treatment:\par 1. Daily Shower\par 2. Weight yourself daily and notify any weight gain greater than 2-3 pounds in\par 24 hours.\par 3. Regular DASH diet - low fat, low cholesterol, no added salt.\par 4. Notify MD and Dentist the need of antibiotic prophylaxis before any dental\par procedure to prevent infection of your new valve.\par 5. Follow Post op TAVR Do's and Don'ts discharge instructions.\par 6. No heavy lifting or straining x 2 day.\par 7. Call / Notify MD any fever greater than 101.0\par 8. Increase Activity as tolerated.\par 9. Check your groin site for bleeding and/or swelling daily following procedure\par and call your doctor immediately if it occurs or if you experience increased\par pain at the site.\par 10. Resume home medication as prescribed and instructed in discharge paperwork.\par Goal(s) To get better and follow your care plan as instructed.\par \par Follow Up:\par Diet Instructions Regular DASH diet - low fat, low cholesterol, no added salt.\par Activity No heavy lifting/straining, Showering allowed, Stairs allowed, Walking\par - Indoors allowed, Walking - Outdoors allowed\par Additional Instructions ***** Notify MD and Dentist the need of antibiotic\par prophylaxis before any dental procedure to prevent infection of your new valve.\par

## 2021-06-10 NOTE — ASSESSMENT
[FreeTextEntry1] : All medications and supplements reviewed  with patient.\par Continue all current meds\par F/U with cardiovascular team as planned\par PT \par F/U 2-3 months

## 2021-07-19 NOTE — CARDIOLOGY SUMMARY
[de-identified] : 5/29/2021 to 6/27/2021 POST-TAVR MCOT demonstrated  86% AF burden, 1 episode of VT with fastest run 129 BPM and 2% PAC and PVC burden. There was no SVT, heart block, or pauses. [de-identified] : 7/22/2021 DONE TODAY, RESULTS PENDING.\par \par 5/28/2021 Mitral annular calcification and calcified mitral leaflets with normal diastolic opening. Mild mitral regurgitation. Transcatheter aortic valve replacement.  The valve is well seated.  Peak transaortic valve gradient equals 23 mm Hg, mean transaortic valve  gradient equals 12 mm Hg, which is probably normal in the presence of a transcatheter aortic valve replacement. No aortic valvular or paravalvular regurgitation seen. Hyperdynamic left ventricular systolic function. Normal right ventricular size and function. *** Compared with echocardiogram of 5/27/2021, no significant changes noted from the post procedure images. [de-identified] : 5/18/2021 Moderately calcified (Agatston calcium score 833) tricuspid aortic valve. Filling defect in the distal left atrial appendage on delayed-phase imaging.  Differential includes incomplete mixing of contrast-opacified and unopacified blood due to slow flow versus thrombus. Bi-atrial enlargement.\par  [de-identified] : 5/10/2021 CATH The coronary circulation is left dominant. LM: Normal. Ostial LAD: discrete 20% stenosis. Mid LAD: minor luminal irregularities with no flow limiting lesions. D2: minor luminal irregularities with no flow limiting lesions. Circumflex: Normal. LPDA: Normal. Ramus intermedius: vessel was small sized with minor luminal irregularities with no flow limiting lesions. RCA: Normal.\par AORTA: The root exhibited normal size.  [de-identified] : 5/26/2021 CAROTID DUPLEX  No significant hemodynamic stenosis of either carotid artery.\par

## 2021-07-19 NOTE — HISTORY OF PRESENT ILLNESS
[FreeTextEntry1] : AMINA GLASER is a 89 year old female here on 07/22/2021, 8 weeks after undergoing a transfemoral transcatheter aortic valve implantation using a 23 mm Evolut Pro bioprosthesis on 5/27/2021.  Post-operative course was uneventful and was discharged on POD #2 with an MCOT. MCOT demonstrated  86% AF burden, 1 episode of VT with fastest run 129 BPM and 2% PAC and PVC burden. There was no SVT, heart block, or pauses.\par \par She comes to the office today with a new TTE reporting feeling _____ overall.\par She currently denies fever, chills, cough, palpitations, angina, dyspnea, dizziness, lightheadedness, syncope, or peripheral edema. Her energy level is and appetite is good. Denies bowel or bladder problems. \par \par 5 meter walk\par NYHA\par PCP: Dr. Sy Laboy\par CARD: Dr. Angelito Lynn\par \par ***NOTE INCOMPLETE***\par

## 2021-07-19 NOTE — DISCUSSION/SUMMARY
[FreeTextEntry1] : ***INCOMPLETE NOTE***\par \par Plan:\par 1) Doing __ 8 weeks s/p PADMINI (23 mm Evolut Pro bioprosthesis on 5/27/2021)\par 2) BP is/is not at goal - Medication changes: \par 3) MCOT received and demonstrated  86% AF burden, 1 episode of VT with fastest run 129 BPM and 2% PAC and PVC burden. There was no SVT, heart block, or pauses.\par 4) TTE done today - results pending.  \par 5) To follow-up with cardiology as scheduled (Dr. Angelito Lynn)\par 6) To follow-up with PCP as scheduled (Dr. Sy Laboy)\par 7) To follow-up with structural heart clinic yearly with TTE or sooner if needed.\par 8) Antibiotic prophylaxis discussed\par \par

## 2021-07-22 ENCOUNTER — APPOINTMENT (OUTPATIENT)
Dept: CARDIOTHORACIC SURGERY | Facility: CLINIC | Age: 86
End: 2021-07-22

## 2021-07-22 LAB
ALBUMIN SERPL ELPH-MCNC: 4.4 G/DL
ALP BLD-CCNC: 102 U/L
ALT SERPL-CCNC: 36 U/L
ANION GAP SERPL CALC-SCNC: 13 MMOL/L
AST SERPL-CCNC: 36 U/L
BILIRUB SERPL-MCNC: 0.5 MG/DL
BUN SERPL-MCNC: 20 MG/DL
CALCIUM SERPL-MCNC: 9.6 MG/DL
CHLORIDE SERPL-SCNC: 105 MMOL/L
CO2 SERPL-SCNC: 21 MMOL/L
CREAT SERPL-MCNC: 0.88 MG/DL
GLUCOSE SERPL-MCNC: 93 MG/DL
NT-PROBNP SERPL-MCNC: 1074 PG/ML
POTASSIUM SERPL-SCNC: 4.2 MMOL/L
PROT SERPL-MCNC: 7.4 G/DL
SODIUM SERPL-SCNC: 139 MMOL/L

## 2021-07-27 ENCOUNTER — APPOINTMENT (OUTPATIENT)
Dept: CARDIOLOGY | Facility: CLINIC | Age: 86
End: 2021-07-27
Payer: MEDICARE

## 2021-07-27 PROCEDURE — 93306 TTE W/DOPPLER COMPLETE: CPT

## 2021-08-25 ENCOUNTER — NON-APPOINTMENT (OUTPATIENT)
Age: 86
End: 2021-08-25

## 2021-08-25 ENCOUNTER — APPOINTMENT (OUTPATIENT)
Dept: CARDIOLOGY | Facility: CLINIC | Age: 86
End: 2021-08-25
Payer: MEDICARE

## 2021-08-25 VITALS
SYSTOLIC BLOOD PRESSURE: 130 MMHG | DIASTOLIC BLOOD PRESSURE: 91 MMHG | HEIGHT: 59 IN | BODY MASS INDEX: 27.82 KG/M2 | OXYGEN SATURATION: 100 % | HEART RATE: 98 BPM | WEIGHT: 138 LBS

## 2021-08-25 DIAGNOSIS — Z72.820 SLEEP DEPRIVATION: ICD-10-CM

## 2021-08-25 PROCEDURE — 99214 OFFICE O/P EST MOD 30 MIN: CPT

## 2021-08-25 PROCEDURE — 36415 COLL VENOUS BLD VENIPUNCTURE: CPT

## 2021-08-25 PROCEDURE — 93000 ELECTROCARDIOGRAM COMPLETE: CPT

## 2021-08-25 NOTE — HISTORY OF PRESENT ILLNESS
[FreeTextEntry1] : Mrs. Oh, accompanied by her , presents in follow-up.. Is accompanied by her .  She feels generally well with the exception of back pain and is scheduled to undergo action therapy in the near future.  Pradaxa will be held for 3 days.  Limited to few hundredfeet, limited by back pain and mild exertional dyspnea without associated chest discomfort.\par \par No c/o chest, throat,jaw, arm or upper back discomfort.  No orthopnea or PND.  No palpitations, dizziness or syncope.  No edema or claudication.\par \par Her other significant problem is ongoing insomnia.  She ruminates and is asked to hours for her to fall asleep.\par \par No pauses/bradycardia/block found during 23day loop recorder monitoring (reviewed personally)

## 2021-08-26 LAB
ALBUMIN SERPL ELPH-MCNC: 4.4 G/DL
ALP BLD-CCNC: 103 U/L
ALT SERPL-CCNC: 29 U/L
ANION GAP SERPL CALC-SCNC: 15 MMOL/L
AST SERPL-CCNC: 26 U/L
BASOPHILS # BLD AUTO: 0.05 K/UL
BASOPHILS NFR BLD AUTO: 0.4 %
BILIRUB SERPL-MCNC: 0.4 MG/DL
BUN SERPL-MCNC: 21 MG/DL
CALCIUM SERPL-MCNC: 9.6 MG/DL
CHLORIDE SERPL-SCNC: 104 MMOL/L
CHOLEST SERPL-MCNC: 149 MG/DL
CO2 SERPL-SCNC: 20 MMOL/L
CREAT SERPL-MCNC: 0.93 MG/DL
DIGOXIN SERPL-MCNC: 1.5 NG/ML
EOSINOPHIL # BLD AUTO: 0.08 K/UL
EOSINOPHIL NFR BLD AUTO: 0.7 %
ESTIMATED AVERAGE GLUCOSE: 128 MG/DL
GLUCOSE SERPL-MCNC: 94 MG/DL
HBA1C MFR BLD HPLC: 6.1 %
HCT VFR BLD CALC: 42.2 %
HDLC SERPL-MCNC: 39 MG/DL
HGB BLD-MCNC: 13.3 G/DL
IMM GRANULOCYTES NFR BLD AUTO: 0.6 %
LDLC SERPL CALC-MCNC: 51 MG/DL
LDLC SERPL DIRECT ASSAY-MCNC: 73 MG/DL
LYMPHOCYTES # BLD AUTO: 3.21 K/UL
LYMPHOCYTES NFR BLD AUTO: 27.9 %
MAN DIFF?: NORMAL
MCHC RBC-ENTMCNC: 29.3 PG
MCHC RBC-ENTMCNC: 31.5 GM/DL
MCV RBC AUTO: 93 FL
MONOCYTES # BLD AUTO: 1.01 K/UL
MONOCYTES NFR BLD AUTO: 8.8 %
NEUTROPHILS # BLD AUTO: 7.1 K/UL
NEUTROPHILS NFR BLD AUTO: 61.6 %
NONHDLC SERPL-MCNC: 111 MG/DL
NT-PROBNP SERPL-MCNC: 669 PG/ML
PLATELET # BLD AUTO: 324 K/UL
POTASSIUM SERPL-SCNC: 5.2 MMOL/L
PROT SERPL-MCNC: 6.6 G/DL
RBC # BLD: 4.54 M/UL
RBC # FLD: 14.6 %
SODIUM SERPL-SCNC: 140 MMOL/L
TRIGL SERPL-MCNC: 297 MG/DL
TSH SERPL-ACNC: 2.66 UIU/ML
WBC # FLD AUTO: 11.52 K/UL

## 2021-09-17 NOTE — BRIEF OPERATIVE NOTE - CONDITION POST OP
JESSICA Villela RN, CHPN; AMY Koehler, TIENW, ACHP-SW; VÍCTOR Nolan, DO; CITLALLI Bland, APRN; AMY Thornton, [facility]    stable/monitered

## 2021-10-07 ENCOUNTER — NON-APPOINTMENT (OUTPATIENT)
Age: 86
End: 2021-10-07

## 2021-10-07 ENCOUNTER — APPOINTMENT (OUTPATIENT)
Dept: INTERNAL MEDICINE | Facility: CLINIC | Age: 86
End: 2021-10-07
Payer: MEDICARE

## 2021-10-07 VITALS — HEART RATE: 84 BPM | DIASTOLIC BLOOD PRESSURE: 78 MMHG | SYSTOLIC BLOOD PRESSURE: 128 MMHG | RESPIRATION RATE: 14 BRPM

## 2021-10-07 VITALS — HEIGHT: 59 IN | BODY MASS INDEX: 28.22 KG/M2 | WEIGHT: 140 LBS

## 2021-10-07 DIAGNOSIS — R53.81 OTHER MALAISE: ICD-10-CM

## 2021-10-07 DIAGNOSIS — R53.83 OTHER MALAISE: ICD-10-CM

## 2021-10-07 PROCEDURE — 36415 COLL VENOUS BLD VENIPUNCTURE: CPT

## 2021-10-07 PROCEDURE — 99214 OFFICE O/P EST MOD 30 MIN: CPT | Mod: 25

## 2021-10-07 PROCEDURE — G0444 DEPRESSION SCREEN ANNUAL: CPT | Mod: 59

## 2021-10-07 PROCEDURE — 99497 ADVNCD CARE PLAN 30 MIN: CPT

## 2021-10-07 PROCEDURE — 93000 ELECTROCARDIOGRAM COMPLETE: CPT | Mod: 59

## 2021-10-07 PROCEDURE — 90662 IIV NO PRSV INCREASED AG IM: CPT

## 2021-10-07 PROCEDURE — G0439: CPT

## 2021-10-07 PROCEDURE — G0008: CPT

## 2021-10-10 NOTE — HISTORY OF PRESENT ILLNESS
[PMH Reviewed and Updated] : past medical history reviewed and updated [PSH Reviewed and Updated] : past surgical history reviewed and updated [Family History Reviewed and Updated] : family history reviewed and updated [Medication and Allergies Reconciled] : medication and allergies reconciled [0] : 0 [Retired] : retired from work [None] : The patient has no concerns about alcohol abuse [Never] : has never used illicit drugs [Compliant with medications] : compliant with medications [Spouse] : spouse [Friends] : friends [Children] : children [Needs some help with ADLs] : need some help with ADLs [Does not drive] : does not drive [No history of falls] : no history of falls [Seatbelts] : seatbelts [Smoke Detectors] : smoke detectors [Carbon Monoxide Detector] : carbon monoxide detector [Bathroom Grab Bars] : bathroom grab bars [Fall Prevention Measures] : fall prevention measures [Sunscreen] : sunscreen [Advanced Directives Discussed] : discussed at today's visit [Over the Past 2 Weeks, Have You Felt Down, Depressed, or Hopeless?] : 1.) Over the past 2 weeks, have you felt down, depressed, or hopeless? No [Over the Past 2 Weeks, Have You Felt Little Interest or Pleasure Doing Things?] : 2.) Over the past 2 weeks, have you felt little interest or pleasure doing things? No [FreeTextEntry1] : Pt presents to establish care, as well as for management of her chronic medical conditions including Afib, HTN, hyperlipidemia, Asthma, s/p stroke.\par  taking zoloft - psychiatrist has retired\par \par Had TAVR earlier this year, tolerated well

## 2021-10-10 NOTE — ASSESSMENT
[FreeTextEntry1] : Blood work was drawn and sent to the lab today. The patient has been instructed to call the office next week to discuss today's lab work.\par \par Continue all meds\par \par 16 minutes spent with patient discussing ACP/HCP. She has designated a proxy, and the proxy is aware of the patients wishes and will comply.\par \par Flu vaccine given\par \par f/u with Cardiology per routine\par Neuro f/u per routine\par \par Routine health counselling provided.\par F/U six months\par \par Annual CC

## 2021-10-10 NOTE — PHYSICAL EXAM
[Sclera] : the sclera and conjunctiva were normal [PERRL With Normal Accommodation] : pupils were equal in size, round, and reactive to light [Extraocular Movements] : extraocular movements were intact [Outer Ear] : the ears and nose were normal in appearance [Oropharynx] : the oropharynx was normal [Neck Appearance] : the appearance of the neck was normal [Neck Cervical Mass (___cm)] : no neck mass was observed [Jugular Venous Distention Increased] : there was no jugular-venous distention [Thyroid Diffuse Enlargement] : the thyroid was not enlarged [Thyroid Nodule] : there were no palpable thyroid nodules [Auscultation Breath Sounds / Voice Sounds] : lungs were clear to auscultation bilaterally [Heart Rate And Rhythm] : heart rate was normal and rhythm regular [Heart Sounds] : normal S1 and S2 [Heart Sounds Gallop] : no gallops [Murmurs] : no murmurs [Heart Sounds Pericardial Friction Rub] : no pericardial rub [Edema] : there was no peripheral edema [Bowel Sounds] : normal bowel sounds [Abdomen Soft] : soft [Abdomen Tenderness] : non-tender [Abdomen Mass (___ Cm)] : no abdominal mass palpated [Cervical Lymph Nodes Enlarged Posterior Bilaterally] : posterior cervical [Cervical Lymph Nodes Enlarged Anterior Bilaterally] : anterior cervical [Supraclavicular Lymph Nodes Enlarged Bilaterally] : supraclavicular [No CVA Tenderness] : no ~M costovertebral angle tenderness [No Spinal Tenderness] : no spinal tenderness [Abnormal Walk] : normal gait [Nail Clubbing] : no clubbing  or cyanosis of the fingernails [Musculoskeletal - Swelling] : no joint swelling seen [Motor Tone] : muscle strength and tone were normal [Skin Color & Pigmentation] : normal skin color and pigmentation [Skin Turgor] : normal skin turgor [] : no rash [Deep Tendon Reflexes (DTR)] : deep tendon reflexes were 2+ and symmetric [Sensation] : the sensory exam was normal to light touch and pinprick [No Focal Deficits] : no focal deficits [Oriented To Time, Place, And Person] : oriented to person, place, and time [Impaired Insight] : insight and judgment were intact [Affect] : the affect was normal [FreeTextEntry1] : deferred to gyn

## 2021-10-10 NOTE — HEALTH RISK ASSESSMENT
[Very Good] : ~his/her~  mood as very good [No falls in past year] : Patient reported no falls in the past year [0] : 2) Feeling down, depressed, or hopeless: Not at all (0) [HIV test declined] : HIV test declined [Hepatitis C test declined] : Hepatitis C test declined [None] : None [With Significant Other] : lives with significant other [] :  [Feels Safe at Home] : Feels safe at home [Fully functional (bathing, dressing, toileting, transferring, walking, feeding)] : Fully functional (bathing, dressing, toileting, transferring, walking, feeding) [Discussed at today's visit] : Advance Directives Discussed at today's visit [Name: ___] : Health Care Proxy's Name: [unfilled]  [Relationship: ___] : Relationship: [unfilled] [] : No [PHQ-2 Negative - No further assessment needed] : PHQ-2 Negative - No further assessment needed [I have developed a follow-up plan documented below in the note.] : I have developed a follow-up plan documented below in the note. [[id number]] : 0 [Change in mental status noted] : No change in mental status noted [Reports changes in hearing] : Reports no changes in hearing [Reports changes in vision] : Reports no changes in vision [Reports changes in dental health] : Reports no changes in dental health [de-identified] : hearing aides [I will adhere to the patient's wishes.] : I will adhere to the patient's wishes. [Time Spent: ___ minutes] : Time Spent: [unfilled] minutes [AdvancecareDate] : 10/21 [FreeTextEntry4] : 16 minutes spent discussing HCP/ACP - she will send a copy ot the office for my review.

## 2021-11-22 ENCOUNTER — APPOINTMENT (OUTPATIENT)
Dept: CARDIOLOGY | Facility: CLINIC | Age: 86
End: 2021-11-22
Payer: MEDICARE

## 2021-11-22 ENCOUNTER — NON-APPOINTMENT (OUTPATIENT)
Age: 86
End: 2021-11-22

## 2021-11-22 VITALS
OXYGEN SATURATION: 97 % | WEIGHT: 140 LBS | HEART RATE: 86 BPM | DIASTOLIC BLOOD PRESSURE: 84 MMHG | BODY MASS INDEX: 28.28 KG/M2 | SYSTOLIC BLOOD PRESSURE: 130 MMHG

## 2021-11-22 DIAGNOSIS — R06.02 SHORTNESS OF BREATH: ICD-10-CM

## 2021-11-22 PROCEDURE — 93000 ELECTROCARDIOGRAM COMPLETE: CPT

## 2021-11-22 PROCEDURE — 99214 OFFICE O/P EST MOD 30 MIN: CPT

## 2021-11-22 PROCEDURE — 36415 COLL VENOUS BLD VENIPUNCTURE: CPT

## 2021-11-22 NOTE — HISTORY OF PRESENT ILLNESS
[FreeTextEntry1] : Mrs. Oh, accompanied by her , presents in scheduled follow-up reporting that "I cannot walk".  Very inactive.  Uses a Rollator at home and is pushed on the Rollator for longer distances by her aide.  She states that she is limited by breathlessness but she may also experiencing back pain.  She reports that the last epidural injection was helpful, her  reports that she has not been complaining as much about back pain.  She continues to sleep on 1 pillow, weight is stable and does not report any edema.\par \par No c/o chest, throat,jaw, arm or upper back discomfort.  No orthopnea or PND.  No palpitations, dizziness or syncope.  No claudication.\par \par \par No anticoagulation related bleeding issues.

## 2021-11-23 LAB
ALBUMIN SERPL ELPH-MCNC: 4.6 G/DL
ALP BLD-CCNC: 129 U/L
ALT SERPL-CCNC: 30 U/L
ANION GAP SERPL CALC-SCNC: 14 MMOL/L
AST SERPL-CCNC: 28 U/L
BASOPHILS # BLD AUTO: 0.05 K/UL
BASOPHILS NFR BLD AUTO: 0.5 %
BILIRUB SERPL-MCNC: 0.4 MG/DL
BUN SERPL-MCNC: 18 MG/DL
CALCIUM SERPL-MCNC: 9.8 MG/DL
CHLORIDE SERPL-SCNC: 105 MMOL/L
CO2 SERPL-SCNC: 21 MMOL/L
CREAT SERPL-MCNC: 0.8 MG/DL
DIGOXIN SERPL-MCNC: 2.5 NG/ML
EOSINOPHIL # BLD AUTO: 0.1 K/UL
EOSINOPHIL NFR BLD AUTO: 1.1 %
GLUCOSE SERPL-MCNC: 93 MG/DL
HCT VFR BLD CALC: 41.1 %
HGB BLD-MCNC: 13.1 G/DL
IMM GRANULOCYTES NFR BLD AUTO: 0.2 %
LYMPHOCYTES # BLD AUTO: 2.4 K/UL
LYMPHOCYTES NFR BLD AUTO: 26.1 %
MAN DIFF?: NORMAL
MCHC RBC-ENTMCNC: 30 PG
MCHC RBC-ENTMCNC: 31.9 GM/DL
MCV RBC AUTO: 94.3 FL
MONOCYTES # BLD AUTO: 0.83 K/UL
MONOCYTES NFR BLD AUTO: 9 %
NEUTROPHILS # BLD AUTO: 5.8 K/UL
NEUTROPHILS NFR BLD AUTO: 63.1 %
NT-PROBNP SERPL-MCNC: 974 PG/ML
PLATELET # BLD AUTO: 315 K/UL
POTASSIUM SERPL-SCNC: 4.6 MMOL/L
PROT SERPL-MCNC: 6.9 G/DL
RBC # BLD: 4.36 M/UL
RBC # FLD: 13.5 %
SODIUM SERPL-SCNC: 140 MMOL/L
TSH SERPL-ACNC: 3.95 UIU/ML
WBC # FLD AUTO: 9.2 K/UL

## 2021-12-01 ENCOUNTER — NON-APPOINTMENT (OUTPATIENT)
Age: 86
End: 2021-12-01

## 2022-03-01 ENCOUNTER — APPOINTMENT (OUTPATIENT)
Dept: CARDIOLOGY | Facility: CLINIC | Age: 87
End: 2022-03-01
Payer: MEDICARE

## 2022-03-01 ENCOUNTER — NON-APPOINTMENT (OUTPATIENT)
Age: 87
End: 2022-03-01

## 2022-03-01 VITALS
DIASTOLIC BLOOD PRESSURE: 86 MMHG | BODY MASS INDEX: 27.47 KG/M2 | WEIGHT: 136 LBS | HEART RATE: 96 BPM | OXYGEN SATURATION: 99 % | SYSTOLIC BLOOD PRESSURE: 140 MMHG

## 2022-03-01 LAB
BASOPHILS # BLD AUTO: 0.04 K/UL
BASOPHILS NFR BLD AUTO: 0.5 %
EOSINOPHIL # BLD AUTO: 0.08 K/UL
EOSINOPHIL NFR BLD AUTO: 1.1 %
HCT VFR BLD CALC: 41.5 %
HGB BLD-MCNC: 13.3 G/DL
IMM GRANULOCYTES NFR BLD AUTO: 0.4 %
LYMPHOCYTES # BLD AUTO: 2.01 K/UL
LYMPHOCYTES NFR BLD AUTO: 26.5 %
MAN DIFF?: NORMAL
MCHC RBC-ENTMCNC: 28.9 PG
MCHC RBC-ENTMCNC: 32 GM/DL
MCV RBC AUTO: 90.2 FL
MONOCYTES # BLD AUTO: 0.67 K/UL
MONOCYTES NFR BLD AUTO: 8.8 %
NEUTROPHILS # BLD AUTO: 4.76 K/UL
NEUTROPHILS NFR BLD AUTO: 62.7 %
PLATELET # BLD AUTO: 284 K/UL
RBC # BLD: 4.6 M/UL
RBC # FLD: 15 %
WBC # FLD AUTO: 7.59 K/UL

## 2022-03-01 PROCEDURE — 99214 OFFICE O/P EST MOD 30 MIN: CPT

## 2022-03-01 PROCEDURE — 36415 COLL VENOUS BLD VENIPUNCTURE: CPT

## 2022-03-01 PROCEDURE — 93000 ELECTROCARDIOGRAM COMPLETE: CPT | Mod: 59

## 2022-03-01 PROCEDURE — 93242 EXT ECG>48HR<7D RECORDING: CPT

## 2022-03-01 NOTE — HISTORY OF PRESENT ILLNESS
[FreeTextEntry1] : Mrs. Oh, accompanied by her , presents in scheduled follow-up reporting that  she is still limited by breathlessness and short distances with a Rollator.  No associated symptoms.  Often experiences back pain in concert with her breathlessness..    She continues to sleep on 1 pillow, weight is stable and does not report any change in mild edema.\par \par No c/o chest, throat,jaw, arm or upper back discomfort.  No orthopnea or PND.  No palpitations, dizziness or syncope.  No claudication.\par \par \par No anticoagulation related bleeding issues.

## 2022-03-02 LAB
ALBUMIN SERPL ELPH-MCNC: 4.6 G/DL
ALP BLD-CCNC: 107 U/L
ALT SERPL-CCNC: 31 U/L
ANION GAP SERPL CALC-SCNC: 13 MMOL/L
AST SERPL-CCNC: 29 U/L
BILIRUB SERPL-MCNC: 0.4 MG/DL
BUN SERPL-MCNC: 18 MG/DL
CALCIUM SERPL-MCNC: 10.1 MG/DL
CHLORIDE SERPL-SCNC: 106 MMOL/L
CO2 SERPL-SCNC: 22 MMOL/L
CREAT SERPL-MCNC: 0.83 MG/DL
EGFR: 67 ML/MIN/1.73M2
GLUCOSE SERPL-MCNC: 95 MG/DL
NT-PROBNP SERPL-MCNC: 674 PG/ML
POTASSIUM SERPL-SCNC: 4.5 MMOL/L
PROT SERPL-MCNC: 7.3 G/DL
SODIUM SERPL-SCNC: 142 MMOL/L
T4 FREE SERPL-MCNC: 0.9 NG/DL
TSH SERPL-ACNC: 4.23 UIU/ML

## 2022-03-15 LAB
25(OH)D3 SERPL-MCNC: 43.1 NG/ML
ALBUMIN SERPL ELPH-MCNC: 4.6 G/DL
ALP BLD-CCNC: 97 U/L
ALT SERPL-CCNC: 28 U/L
ANION GAP SERPL CALC-SCNC: 17 MMOL/L
AST SERPL-CCNC: 24 U/L
BASOPHILS # BLD AUTO: 0.08 K/UL
BASOPHILS NFR BLD AUTO: 0.8 %
BILIRUB SERPL-MCNC: 0.3 MG/DL
BUN SERPL-MCNC: 19 MG/DL
CALCIUM SERPL-MCNC: 9.5 MG/DL
CHLORIDE SERPL-SCNC: 108 MMOL/L
CHOLEST SERPL-MCNC: 153 MG/DL
CO2 SERPL-SCNC: 19 MMOL/L
CREAT SERPL-MCNC: 0.92 MG/DL
DEPRECATED D DIMER PPP IA-ACNC: <150 NG/ML DDU
EOSINOPHIL # BLD AUTO: 0.1 K/UL
EOSINOPHIL NFR BLD AUTO: 1.1 %
ESTIMATED AVERAGE GLUCOSE: 126 MG/DL
FOLATE SERPL-MCNC: >20 NG/ML
GLUCOSE SERPL-MCNC: 86 MG/DL
HBA1C MFR BLD HPLC: 6 %
HCT VFR BLD CALC: 41.5 %
HDLC SERPL-MCNC: 40 MG/DL
HGB BLD-MCNC: 13.3 G/DL
IMM GRANULOCYTES NFR BLD AUTO: 0.2 %
LDLC SERPL CALC-MCNC: 62 MG/DL
LYMPHOCYTES # BLD AUTO: 2.76 K/UL
LYMPHOCYTES NFR BLD AUTO: 29.3 %
MAGNESIUM SERPL-MCNC: 2.3 MG/DL
MAN DIFF?: NORMAL
MCHC RBC-ENTMCNC: 29.7 PG
MCHC RBC-ENTMCNC: 32 GM/DL
MCV RBC AUTO: 92.6 FL
MONOCYTES # BLD AUTO: 1.11 K/UL
MONOCYTES NFR BLD AUTO: 11.8 %
NEUTROPHILS # BLD AUTO: 5.36 K/UL
NEUTROPHILS NFR BLD AUTO: 56.8 %
NONHDLC SERPL-MCNC: 113 MG/DL
PLATELET # BLD AUTO: 335 K/UL
POTASSIUM SERPL-SCNC: 4 MMOL/L
PROT SERPL-MCNC: 6.9 G/DL
RBC # BLD: 4.48 M/UL
RBC # FLD: 14.8 %
SODIUM SERPL-SCNC: 144 MMOL/L
T4 FREE SERPL-MCNC: 0.9 NG/DL
T4 SERPL-MCNC: 5.5 UG/DL
TRIGL SERPL-MCNC: 251 MG/DL
TSH SERPL-ACNC: 3.47 UIU/ML
URATE SERPL-MCNC: 4.1 MG/DL
VIT B12 SERPL-MCNC: 1692 PG/ML
WBC # FLD AUTO: 9.43 K/UL

## 2022-04-11 PROBLEM — Z11.59 SCREENING FOR VIRAL DISEASE: Status: ACTIVE | Noted: 2020-09-21

## 2022-06-07 ENCOUNTER — NON-APPOINTMENT (OUTPATIENT)
Age: 87
End: 2022-06-07

## 2022-06-07 ENCOUNTER — APPOINTMENT (OUTPATIENT)
Dept: CARDIOLOGY | Facility: CLINIC | Age: 87
End: 2022-06-07
Payer: MEDICARE

## 2022-06-07 VITALS
SYSTOLIC BLOOD PRESSURE: 140 MMHG | WEIGHT: 137 LBS | HEART RATE: 79 BPM | BODY MASS INDEX: 27.67 KG/M2 | OXYGEN SATURATION: 99 % | DIASTOLIC BLOOD PRESSURE: 78 MMHG

## 2022-06-07 DIAGNOSIS — R06.00 DYSPNEA, UNSPECIFIED: ICD-10-CM

## 2022-06-07 DIAGNOSIS — R09.81 NASAL CONGESTION: ICD-10-CM

## 2022-06-07 PROCEDURE — 93000 ELECTROCARDIOGRAM COMPLETE: CPT

## 2022-06-07 PROCEDURE — 36415 COLL VENOUS BLD VENIPUNCTURE: CPT

## 2022-06-07 PROCEDURE — 99214 OFFICE O/P EST MOD 30 MIN: CPT

## 2022-06-07 NOTE — CARDIOLOGY SUMMARY
[de-identified] : 3-day ZIO XT (3/1/2022 - 3/4/2022): Atrial fibrillation with ventricular rate in 48 to 152 bpm averaging 79 bpm.  Isolated VE's.

## 2022-06-07 NOTE — HISTORY OF PRESENT ILLNESS
[FreeTextEntry1] : Mrs. Oh, accompanied by her , presents in scheduled follow-up reporting that  she is still limited by breathlessness and short distances with a Rollator.  No associated symptoms. She continues to sleep on 1 pillow, weight is stable and does not report any change in mild edema.\par \par No c/o chest, throat,jaw, arm or upper back discomfort.  No orthopnea or PND.  No palpitations, dizziness or syncope.  No claudication.\par \par No anticoagulation related bleeding issues.\par \par 3-day ZIO XT revealed 100% atrial fibrillation with average ventricular rate of 76 bpm and only rare PVCs.

## 2022-06-08 LAB
BASOPHILS # BLD AUTO: 0.05 K/UL
BASOPHILS NFR BLD AUTO: 0.6 %
EOSINOPHIL # BLD AUTO: 0.07 K/UL
EOSINOPHIL NFR BLD AUTO: 0.8 %
HCT VFR BLD CALC: 41.6 %
HGB BLD-MCNC: 13.4 G/DL
IMM GRANULOCYTES NFR BLD AUTO: 0.3 %
LYMPHOCYTES # BLD AUTO: 2.23 K/UL
LYMPHOCYTES NFR BLD AUTO: 25.5 %
MAN DIFF?: NORMAL
MCHC RBC-ENTMCNC: 30.1 PG
MCHC RBC-ENTMCNC: 32.2 GM/DL
MCV RBC AUTO: 93.5 FL
MONOCYTES # BLD AUTO: 0.77 K/UL
MONOCYTES NFR BLD AUTO: 8.8 %
NEUTROPHILS # BLD AUTO: 5.61 K/UL
NEUTROPHILS NFR BLD AUTO: 64 %
NT-PROBNP SERPL-MCNC: 675 PG/ML
PLATELET # BLD AUTO: 271 K/UL
RBC # BLD: 4.45 M/UL
RBC # FLD: 13.6 %
WBC # FLD AUTO: 8.76 K/UL

## 2022-06-09 LAB
ALBUMIN SERPL ELPH-MCNC: 4.8 G/DL
ALP BLD-CCNC: 103 U/L
ALT SERPL-CCNC: 27 U/L
ANION GAP SERPL CALC-SCNC: 16 MMOL/L
AST SERPL-CCNC: 25 U/L
BILIRUB SERPL-MCNC: 0.5 MG/DL
BUN SERPL-MCNC: 21 MG/DL
CALCIUM SERPL-MCNC: 9.9 MG/DL
CHLORIDE SERPL-SCNC: 107 MMOL/L
CO2 SERPL-SCNC: 22 MMOL/L
CREAT SERPL-MCNC: 1 MG/DL
EGFR: 54 ML/MIN/1.73M2
GLUCOSE SERPL-MCNC: 101 MG/DL
POTASSIUM SERPL-SCNC: 5.5 MMOL/L
PROT SERPL-MCNC: 7.1 G/DL
SODIUM SERPL-SCNC: 144 MMOL/L

## 2022-08-22 ENCOUNTER — RX RENEWAL (OUTPATIENT)
Age: 87
End: 2022-08-22

## 2022-09-07 ENCOUNTER — APPOINTMENT (OUTPATIENT)
Dept: PULMONOLOGY | Facility: CLINIC | Age: 87
End: 2022-09-07

## 2022-09-23 ENCOUNTER — NON-APPOINTMENT (OUTPATIENT)
Age: 87
End: 2022-09-23

## 2022-09-23 ENCOUNTER — APPOINTMENT (OUTPATIENT)
Dept: INTERNAL MEDICINE | Facility: CLINIC | Age: 87
End: 2022-09-23

## 2022-09-23 VITALS
WEIGHT: 139 LBS | HEART RATE: 72 BPM | BODY MASS INDEX: 28.02 KG/M2 | DIASTOLIC BLOOD PRESSURE: 78 MMHG | HEIGHT: 59 IN | SYSTOLIC BLOOD PRESSURE: 138 MMHG | RESPIRATION RATE: 14 BRPM

## 2022-09-23 DIAGNOSIS — Z23 ENCOUNTER FOR IMMUNIZATION: ICD-10-CM

## 2022-09-23 DIAGNOSIS — L29.9 PRURITUS, UNSPECIFIED: ICD-10-CM

## 2022-09-23 DIAGNOSIS — Z00.00 ENCOUNTER FOR GENERAL ADULT MEDICAL EXAMINATION W/OUT ABNORMAL FINDINGS: ICD-10-CM

## 2022-09-23 DIAGNOSIS — K21.9 GASTRO-ESOPHAGEAL REFLUX DISEASE W/OUT ESOPHAGITIS: ICD-10-CM

## 2022-09-23 PROCEDURE — 99214 OFFICE O/P EST MOD 30 MIN: CPT | Mod: 25

## 2022-09-23 PROCEDURE — G0439: CPT

## 2022-09-23 PROCEDURE — 99497 ADVNCD CARE PLAN 30 MIN: CPT

## 2022-09-23 PROCEDURE — 93000 ELECTROCARDIOGRAM COMPLETE: CPT | Mod: 59

## 2022-09-23 PROCEDURE — 36415 COLL VENOUS BLD VENIPUNCTURE: CPT

## 2022-09-23 PROCEDURE — G0444 DEPRESSION SCREEN ANNUAL: CPT | Mod: 59

## 2022-09-23 PROCEDURE — 90662 IIV NO PRSV INCREASED AG IM: CPT

## 2022-09-23 PROCEDURE — G0008: CPT

## 2022-09-26 NOTE — HEALTH RISK ASSESSMENT
[Very Good] : ~his/her~  mood as very good [No falls in past year] : Patient reported no falls in the past year [0] : 2) Feeling down, depressed, or hopeless: Not at all (0) [I have developed a follow-up plan documented below in the note.] : I have developed a follow-up plan documented below in the note. [HIV test declined] : HIV test declined [Hepatitis C test declined] : Hepatitis C test declined [None] : None [With Significant Other] : lives with significant other [] :  [Feels Safe at Home] : Feels safe at home [Fully functional (bathing, dressing, toileting, transferring, walking, feeding)] : Fully functional (bathing, dressing, toileting, transferring, walking, feeding) [Name: ___] : Health Care Proxy's Name: [unfilled]  [I will adhere to the patient's wishes.] : I will adhere to the patient's wishes. [Time Spent: ___ minutes] : Time Spent: [unfilled] minutes [Discussed at today's visit] : Advance Directives Discussed at today's visit [Relationship: ___] : Relationship: [unfilled] [PHQ-9 Positive] : PHQ-9 Positive [Change in mental status noted] : No change in mental status noted [Reports changes in hearing] : Reports no changes in hearing [Reports changes in vision] : Reports no changes in vision [Reports changes in dental health] : Reports no changes in dental health [de-identified] : hearing aides [AdvancecareDate] : 9/22 [FreeTextEntry4] : 16 minutes spent discussing HCP/ACP - she will send a copy ot the office for my review.

## 2022-09-26 NOTE — HISTORY OF PRESENT ILLNESS
[PMH Reviewed and Updated] : past medical history reviewed and updated [PSH Reviewed and Updated] : past surgical history reviewed and updated [Family History Reviewed and Updated] : family history reviewed and updated [Medication and Allergies Reconciled] : medication and allergies reconciled [0] : 0 [Retired] : retired from work [None] : The patient has no concerns about alcohol abuse [Never] : has never used illicit drugs [Compliant with medications] : compliant with medications [Spouse] : spouse [Friends] : friends [Children] : children [Needs some help with ADLs] : need some help with ADLs [Does not drive] : does not drive [No history of falls] : no history of falls [Seatbelts] : seatbelts [Smoke Detectors] : smoke detectors [Carbon Monoxide Detector] : carbon monoxide detector [Bathroom Grab Bars] : bathroom grab bars [Fall Prevention Measures] : fall prevention measures [Sunscreen] : sunscreen [Advanced Directives Discussed] : discussed at today's visit [Over the Past 2 Weeks, Have You Felt Down, Depressed, or Hopeless?] : 1.) Over the past 2 weeks, have you felt down, depressed, or hopeless? No [Over the Past 2 Weeks, Have You Felt Little Interest or Pleasure Doing Things?] : 2.) Over the past 2 weeks, have you felt little interest or pleasure doing things? No [FreeTextEntry1] : Pt presents to establish care, as well as for management of her chronic medical conditions including Afib, HTN, hyperlipidemia, Asthma, s/p stroke.\par  taking zoloft - psychiatrist has retired\par \par Had TAVR last year, tolerated well\par \par no acute issues

## 2022-10-03 LAB
25(OH)D3 SERPL-MCNC: 44.5 NG/ML
ALBUMIN SERPL ELPH-MCNC: 4.7 G/DL
ALP BLD-CCNC: 102 U/L
ALT SERPL-CCNC: 30 U/L
ANION GAP SERPL CALC-SCNC: 15 MMOL/L
AST SERPL-CCNC: 26 U/L
BASOPHILS # BLD AUTO: 0.06 K/UL
BASOPHILS NFR BLD AUTO: 0.7 %
BILIRUB SERPL-MCNC: 0.3 MG/DL
BUN SERPL-MCNC: 19 MG/DL
CALCIUM SERPL-MCNC: 9.7 MG/DL
CHLORIDE SERPL-SCNC: 109 MMOL/L
CHOLEST SERPL-MCNC: 172 MG/DL
CO2 SERPL-SCNC: 22 MMOL/L
CREAT SERPL-MCNC: 0.96 MG/DL
DIGOXIN SERPL-MCNC: 1.9 NG/ML
EGFR: 56 ML/MIN/1.73M2
EOSINOPHIL # BLD AUTO: 0.09 K/UL
EOSINOPHIL NFR BLD AUTO: 1 %
FOLATE SERPL-MCNC: >20 NG/ML
GLUCOSE SERPL-MCNC: 118 MG/DL
HCT VFR BLD CALC: 41.6 %
HDLC SERPL-MCNC: 38 MG/DL
HGB BLD-MCNC: 13.4 G/DL
IMM GRANULOCYTES NFR BLD AUTO: 0.2 %
LDLC SERPL CALC-MCNC: 86 MG/DL
LYMPHOCYTES # BLD AUTO: 2.76 K/UL
LYMPHOCYTES NFR BLD AUTO: 30.5 %
MAGNESIUM SERPL-MCNC: 2.4 MG/DL
MAN DIFF?: NORMAL
MCHC RBC-ENTMCNC: 30.9 PG
MCHC RBC-ENTMCNC: 32.2 GM/DL
MCV RBC AUTO: 95.9 FL
MONOCYTES # BLD AUTO: 0.86 K/UL
MONOCYTES NFR BLD AUTO: 9.5 %
NEUTROPHILS # BLD AUTO: 5.27 K/UL
NEUTROPHILS NFR BLD AUTO: 58.1 %
NONHDLC SERPL-MCNC: 134 MG/DL
PLATELET # BLD AUTO: 294 K/UL
POTASSIUM SERPL-SCNC: 4.6 MMOL/L
PROT SERPL-MCNC: 7.2 G/DL
RBC # BLD: 4.34 M/UL
RBC # FLD: 13.6 %
SODIUM SERPL-SCNC: 145 MMOL/L
T4 FREE SERPL-MCNC: 0.8 NG/DL
T4 SERPL-MCNC: 4.9 UG/DL
TRIGL SERPL-MCNC: 240 MG/DL
TSH SERPL-ACNC: 4.43 UIU/ML
VIT B12 SERPL-MCNC: >2000 PG/ML
WBC # FLD AUTO: 9.06 K/UL

## 2022-10-04 ENCOUNTER — NON-APPOINTMENT (OUTPATIENT)
Age: 87
End: 2022-10-04

## 2022-10-04 ENCOUNTER — APPOINTMENT (OUTPATIENT)
Dept: CARDIOLOGY | Facility: CLINIC | Age: 87
End: 2022-10-04

## 2022-10-04 VITALS
SYSTOLIC BLOOD PRESSURE: 110 MMHG | BODY MASS INDEX: 28.02 KG/M2 | WEIGHT: 139 LBS | HEART RATE: 73 BPM | HEIGHT: 59 IN | DIASTOLIC BLOOD PRESSURE: 70 MMHG | OXYGEN SATURATION: 98 %

## 2022-10-04 PROCEDURE — 93000 ELECTROCARDIOGRAM COMPLETE: CPT

## 2022-10-04 PROCEDURE — 99214 OFFICE O/P EST MOD 30 MIN: CPT

## 2022-10-04 NOTE — CARDIOLOGY SUMMARY
[de-identified] : 3-day ZIO XT (3/1/2022 - 3/4/2022): Atrial fibrillation with ventricular rate in 48 to 152 bpm averaging 79 bpm.  Isolated VE's.

## 2022-10-04 NOTE — HISTORY OF PRESENT ILLNESS
[FreeTextEntry1] : Mrs. Oh, accompanied by her  and he HHA, presents in scheduled follow-up reporting that  she has been feeling well.  Walking with a Rollator down along hallways of her building without any dyspnea.  She continues to sleep on 1 pillow, weight is stable and does not report any change in mild edema.\par \par No c/o chest, throat,jaw, arm or upper back discomfort.  No orthopnea or PND.  No palpitations, dizziness or syncope.  No claudication.\par \par No anticoagulation related bleeding issues.\par \par

## 2022-11-08 ENCOUNTER — RX RENEWAL (OUTPATIENT)
Age: 87
End: 2022-11-08

## 2023-01-04 RX ORDER — ATORVASTATIN CALCIUM 20 MG/1
20 TABLET, FILM COATED ORAL
Qty: 90 | Refills: 2 | Status: ACTIVE | COMMUNITY
Start: 2017-06-30 | End: 1900-01-01

## 2023-02-21 ENCOUNTER — APPOINTMENT (OUTPATIENT)
Dept: CARDIOLOGY | Facility: CLINIC | Age: 88
End: 2023-02-21
Payer: MEDICARE

## 2023-02-21 ENCOUNTER — NON-APPOINTMENT (OUTPATIENT)
Age: 88
End: 2023-02-21

## 2023-02-21 VITALS
WEIGHT: 141 LBS | DIASTOLIC BLOOD PRESSURE: 80 MMHG | HEART RATE: 74 BPM | SYSTOLIC BLOOD PRESSURE: 130 MMHG | BODY MASS INDEX: 28.48 KG/M2 | OXYGEN SATURATION: 99 %

## 2023-02-21 PROCEDURE — 93000 ELECTROCARDIOGRAM COMPLETE: CPT

## 2023-02-21 PROCEDURE — 99214 OFFICE O/P EST MOD 30 MIN: CPT

## 2023-02-21 NOTE — CARDIOLOGY SUMMARY
[de-identified] : 3-day ZIO XT (3/1/2022 - 3/4/2022): Atrial fibrillation with ventricular rate in 48 to 152 bpm averaging 79 bpm.  Isolated VE's.

## 2023-02-21 NOTE — HISTORY OF PRESENT ILLNESS
[FreeTextEntry1] : Mrs. Oh, accompanied by her  and he HHA, presents in scheduled follow-up reporting that  she has been feeling well.  Tolerating physical therapy and walking with a Rollator down along hallways of her building without any dyspnea.  She continues to sleep on 1 pillow, weight is stable and does not report any change in mild edema.\par \par No c/o chest, throat,jaw, arm or upper back discomfort.  No orthopnea or PND.  No palpitations, dizziness or syncope.  No claudication.\par \par No anticoagulation related bleeding issues.\par \par

## 2023-03-08 ENCOUNTER — LABORATORY RESULT (OUTPATIENT)
Age: 88
End: 2023-03-08

## 2023-04-02 NOTE — PRE-ANESTHESIA EVALUATION ADULT - NSANTHRISKNONERD_GEN_ALL_CORE
[facility]  [address] [facility], [address] [place] [address]  [phone]      Hospitalist Progress Note      NAME: Ray Ugarte   :  1963  MRM:  [id number]    Madeline Alberto MD Risk Alerts:

## 2023-05-24 ENCOUNTER — RX RENEWAL (OUTPATIENT)
Age: 88
End: 2023-05-24

## 2023-08-29 ENCOUNTER — APPOINTMENT (OUTPATIENT)
Dept: CARDIOLOGY | Facility: CLINIC | Age: 88
End: 2023-08-29
Payer: MEDICARE

## 2023-08-29 ENCOUNTER — NON-APPOINTMENT (OUTPATIENT)
Age: 88
End: 2023-08-29

## 2023-08-29 VITALS — DIASTOLIC BLOOD PRESSURE: 78 MMHG | OXYGEN SATURATION: 98 % | SYSTOLIC BLOOD PRESSURE: 112 MMHG | HEART RATE: 76 BPM

## 2023-08-29 PROCEDURE — 93306 TTE W/DOPPLER COMPLETE: CPT

## 2023-08-29 PROCEDURE — 93000 ELECTROCARDIOGRAM COMPLETE: CPT

## 2023-08-29 PROCEDURE — 99214 OFFICE O/P EST MOD 30 MIN: CPT

## 2023-08-29 NOTE — CARDIOLOGY SUMMARY
[de-identified] : 3-day ZIO XT (3/1/2022 - 3/4/2022): Atrial fibrillation with ventricular rate in 48 to 152 bpm averaging 79 bpm.  Isolated VE's.

## 2023-08-29 NOTE — HISTORY OF PRESENT ILLNESS
[FreeTextEntry1] : Mrs. Oh, accompanied by her  and he HHA, presents in scheduled follow-up reporting that  she has been feeling well.  Tolerating physical therapy and walking with a Rollator down along hallways of her building without any dyspnea.  She continues to sleep on 1 pillow, weight is stable and does not report any change in mild edema.  No c/o chest, throat,jaw, arm or upper back discomfort.  No orthopnea or PND.  No palpitations, dizziness or syncope.  No claudication.  No anticoagulation related bleeding issues.

## 2023-11-03 ENCOUNTER — APPOINTMENT (OUTPATIENT)
Dept: GERIATRICS | Facility: CLINIC | Age: 88
End: 2023-11-03
Payer: MEDICARE

## 2023-11-03 ENCOUNTER — NON-APPOINTMENT (OUTPATIENT)
Age: 88
End: 2023-11-03

## 2023-11-03 VITALS
HEART RATE: 82 BPM | OXYGEN SATURATION: 97 % | TEMPERATURE: 97.3 F | SYSTOLIC BLOOD PRESSURE: 135 MMHG | RESPIRATION RATE: 14 BRPM | DIASTOLIC BLOOD PRESSURE: 78 MMHG | WEIGHT: 144 LBS | BODY MASS INDEX: 29.08 KG/M2

## 2023-11-03 DIAGNOSIS — Z86.79 PERSONAL HISTORY OF OTHER DISEASES OF THE CIRCULATORY SYSTEM: ICD-10-CM

## 2023-11-03 DIAGNOSIS — Z87.19 PERSONAL HISTORY OF OTHER DISEASES OF THE DIGESTIVE SYSTEM: ICD-10-CM

## 2023-11-03 DIAGNOSIS — G47.33 OBSTRUCTIVE SLEEP APNEA (ADULT) (PEDIATRIC): ICD-10-CM

## 2023-11-03 DIAGNOSIS — Z86.59 PERSONAL HISTORY OF OTHER MENTAL AND BEHAVIORAL DISORDERS: ICD-10-CM

## 2023-11-03 DIAGNOSIS — M24.541 CONTRACTURE, RIGHT HAND: ICD-10-CM

## 2023-11-03 DIAGNOSIS — Z95.2 PRESENCE OF PROSTHETIC HEART VALVE: ICD-10-CM

## 2023-11-03 DIAGNOSIS — Z86.39 PERSONAL HISTORY OF OTHER ENDOCRINE, NUTRITIONAL AND METABOLIC DISEASE: ICD-10-CM

## 2023-11-03 DIAGNOSIS — Z86.69 PERSONAL HISTORY OF OTHER DISEASES OF THE NERVOUS SYSTEM AND SENSE ORGANS: ICD-10-CM

## 2023-11-03 DIAGNOSIS — M16.0 BILATERAL PRIMARY OSTEOARTHRITIS OF HIP: ICD-10-CM

## 2023-11-03 DIAGNOSIS — G25.0 ESSENTIAL TREMOR: ICD-10-CM

## 2023-11-03 DIAGNOSIS — Z23 ENCOUNTER FOR IMMUNIZATION: ICD-10-CM

## 2023-11-03 PROCEDURE — 99497 ADVNCD CARE PLAN 30 MIN: CPT

## 2023-11-03 PROCEDURE — G0008: CPT

## 2023-11-03 PROCEDURE — 90662 IIV NO PRSV INCREASED AG IM: CPT

## 2023-11-03 PROCEDURE — 99204 OFFICE O/P NEW MOD 45 MIN: CPT | Mod: 25

## 2023-11-03 RX ORDER — TORSEMIDE 5 MG/1
5 TABLET ORAL DAILY
Qty: 30 | Refills: 5 | Status: DISCONTINUED | COMMUNITY
Start: 2022-06-07 | End: 2023-11-03

## 2023-11-04 ENCOUNTER — TRANSCRIPTION ENCOUNTER (OUTPATIENT)
Age: 88
End: 2023-11-04

## 2023-11-06 PROBLEM — Z86.59 HISTORY OF DEPRESSION: Status: RESOLVED | Noted: 2023-11-06 | Resolved: 2023-11-06

## 2023-11-06 PROBLEM — M16.0 OSTEOARTHRITIS OF HIPS, BILATERAL: Status: RESOLVED | Noted: 2023-11-06 | Resolved: 2023-11-06

## 2023-11-06 PROBLEM — G25.0 ESSENTIAL TREMOR: Status: ACTIVE | Noted: 2019-08-02

## 2023-11-06 PROBLEM — Z86.39 HISTORY OF HYPERLIPIDEMIA: Status: RESOLVED | Noted: 2023-11-06 | Resolved: 2023-11-06

## 2023-11-06 PROBLEM — Z86.59 HISTORY OF ANXIETY: Status: RESOLVED | Noted: 2023-11-06 | Resolved: 2023-11-06

## 2023-11-06 PROBLEM — Z86.79 HISTORY OF AORTIC VALVE STENOSIS: Status: RESOLVED | Noted: 2023-11-06 | Resolved: 2023-11-06

## 2023-11-06 PROBLEM — Z23 ENCOUNTER FOR IMMUNIZATION: Status: ACTIVE | Noted: 2020-09-21

## 2023-11-06 PROBLEM — Z95.2 S/P TAVR (TRANSCATHETER AORTIC VALVE REPLACEMENT): Status: RESOLVED | Noted: 2023-11-06 | Resolved: 2023-11-06

## 2023-11-06 PROBLEM — Z86.79 HISTORY OF ATRIAL FIBRILLATION: Status: RESOLVED | Noted: 2023-11-06 | Resolved: 2023-11-06

## 2023-11-06 PROBLEM — M24.541 CONTRACTURE OF RIGHT HAND: Status: ACTIVE | Noted: 2023-11-03

## 2023-11-06 PROBLEM — Z87.19 HISTORY OF GASTROESOPHAGEAL REFLUX (GERD): Status: RESOLVED | Noted: 2023-11-06 | Resolved: 2023-11-06

## 2023-11-06 PROBLEM — G47.33 OSA (OBSTRUCTIVE SLEEP APNEA): Status: ACTIVE | Noted: 2017-06-13

## 2023-11-06 PROBLEM — Z86.69 HISTORY OF CHRONIC FATIGUE SYNDROME: Status: RESOLVED | Noted: 2023-11-06 | Resolved: 2023-11-06

## 2023-11-06 PROBLEM — G47.33 OBSTRUCTIVE SLEEP APNEA, ADULT: Status: RESOLVED | Noted: 2023-11-06 | Resolved: 2023-11-06

## 2023-11-06 RX ORDER — MUPIROCIN 20 MG/G
2 OINTMENT TOPICAL
Qty: 22 | Refills: 0 | Status: DISCONTINUED | COMMUNITY
Start: 2023-10-02

## 2023-11-06 RX ORDER — HYDROXYZINE HYDROCHLORIDE 10 MG/1
10 TABLET ORAL
Qty: 60 | Refills: 1 | Status: DISCONTINUED | COMMUNITY
Start: 2022-09-23 | End: 2023-11-06

## 2023-11-06 RX ORDER — AZELASTINE HYDROCHLORIDE 205.5 UG/1
0.15 SPRAY, METERED NASAL TWICE DAILY
Qty: 3 | Refills: 1 | Status: DISCONTINUED | COMMUNITY
Start: 2018-01-11 | End: 2023-11-06

## 2023-11-21 ENCOUNTER — RX RENEWAL (OUTPATIENT)
Age: 88
End: 2023-11-21

## 2023-11-27 ENCOUNTER — APPOINTMENT (OUTPATIENT)
Dept: INTERNAL MEDICINE | Facility: CLINIC | Age: 88
End: 2023-11-27

## 2023-11-28 ENCOUNTER — APPOINTMENT (OUTPATIENT)
Dept: GERIATRICS | Facility: CLINIC | Age: 88
End: 2023-11-28
Payer: MEDICARE

## 2023-11-28 VITALS
TEMPERATURE: 97.8 F | RESPIRATION RATE: 15 BRPM | OXYGEN SATURATION: 98 % | DIASTOLIC BLOOD PRESSURE: 92 MMHG | SYSTOLIC BLOOD PRESSURE: 136 MMHG | WEIGHT: 143 LBS | HEART RATE: 76 BPM | BODY MASS INDEX: 28.88 KG/M2

## 2023-11-28 DIAGNOSIS — Z71.89 OTHER SPECIFIED COUNSELING: ICD-10-CM

## 2023-11-28 PROCEDURE — 99214 OFFICE O/P EST MOD 30 MIN: CPT

## 2023-12-12 ENCOUNTER — APPOINTMENT (OUTPATIENT)
Dept: GERIATRICS | Facility: CLINIC | Age: 88
End: 2023-12-12

## 2024-01-02 ENCOUNTER — APPOINTMENT (OUTPATIENT)
Dept: GERIATRICS | Facility: CLINIC | Age: 89
End: 2024-01-02

## 2024-02-05 ENCOUNTER — NON-APPOINTMENT (OUTPATIENT)
Age: 89
End: 2024-02-05

## 2024-02-05 ENCOUNTER — APPOINTMENT (OUTPATIENT)
Dept: CARDIOLOGY | Facility: CLINIC | Age: 89
End: 2024-02-05
Payer: MEDICARE

## 2024-02-05 VITALS
HEART RATE: 80 BPM | WEIGHT: 139 LBS | SYSTOLIC BLOOD PRESSURE: 138 MMHG | OXYGEN SATURATION: 98 % | DIASTOLIC BLOOD PRESSURE: 70 MMHG | BODY MASS INDEX: 28.07 KG/M2

## 2024-02-05 DIAGNOSIS — Z95.3 PRESENCE OF XENOGENIC HEART VALVE: ICD-10-CM

## 2024-02-05 DIAGNOSIS — Z86.73 PERSONAL HISTORY OF TRANSIENT ISCHEMIC ATTACK (TIA), AND CEREBRAL INFARCTION W/OUT RESIDUAL DEFICITS: ICD-10-CM

## 2024-02-05 PROCEDURE — 99214 OFFICE O/P EST MOD 30 MIN: CPT

## 2024-02-05 PROCEDURE — 93000 ELECTROCARDIOGRAM COMPLETE: CPT

## 2024-02-05 NOTE — HISTORY OF PRESENT ILLNESS
[FreeTextEntry1] : Mrs. Oh, accompanied by her HHA, presents in scheduled follow-up reporting that  she has been feeling well.  Recently resumed physical therapy which she is tolerating well.  Activity is otherwise minimal.  No c/o chest, throat,jaw, arm or upper back discomfort.  No dyspnea, orthopnea or PND.  No palpitations, dizziness or syncope.  No edema or claudication..  No c/o chest, throat,jaw, arm or upper back discomfort.  No orthopnea or PND.  No palpitations, dizziness or syncope.  No claudication.  No anticoagulation related bleeding issues.

## 2024-02-05 NOTE — CARDIOLOGY SUMMARY
[de-identified] : 3-day ZIO XT (3/1/2022 - 3/4/2022): Atrial fibrillation with ventricular rate in 48 to 152 bpm averaging 79 bpm.  Isolated VE's.

## 2024-02-27 ENCOUNTER — APPOINTMENT (OUTPATIENT)
Dept: GERIATRICS | Facility: CLINIC | Age: 89
End: 2024-02-27
Payer: MEDICARE

## 2024-02-27 VITALS
SYSTOLIC BLOOD PRESSURE: 138 MMHG | OXYGEN SATURATION: 98 % | BODY MASS INDEX: 28.68 KG/M2 | DIASTOLIC BLOOD PRESSURE: 68 MMHG | HEIGHT: 59 IN | HEART RATE: 78 BPM | TEMPERATURE: 97.4 F | WEIGHT: 142.25 LBS

## 2024-02-27 DIAGNOSIS — Z91.81 HISTORY OF FALLING: ICD-10-CM

## 2024-02-27 DIAGNOSIS — R53.82 CHRONIC FATIGUE, UNSPECIFIED: ICD-10-CM

## 2024-02-27 DIAGNOSIS — R26.81 UNSTEADINESS ON FEET: ICD-10-CM

## 2024-02-27 DIAGNOSIS — I10 ESSENTIAL (PRIMARY) HYPERTENSION: ICD-10-CM

## 2024-02-27 DIAGNOSIS — E78.5 HYPERLIPIDEMIA, UNSPECIFIED: ICD-10-CM

## 2024-02-27 DIAGNOSIS — F32.A ANXIETY DISORDER, UNSPECIFIED: ICD-10-CM

## 2024-02-27 DIAGNOSIS — I48.91 UNSPECIFIED ATRIAL FIBRILLATION: ICD-10-CM

## 2024-02-27 DIAGNOSIS — F41.9 ANXIETY DISORDER, UNSPECIFIED: ICD-10-CM

## 2024-02-27 DIAGNOSIS — R41.3 OTHER AMNESIA: ICD-10-CM

## 2024-02-27 PROCEDURE — G2211 COMPLEX E/M VISIT ADD ON: CPT

## 2024-02-27 PROCEDURE — 99214 OFFICE O/P EST MOD 30 MIN: CPT

## 2024-02-27 RX ORDER — ASPIRIN 81 MG
81 TABLET, DELAYED RELEASE (ENTERIC COATED) ORAL
Refills: 0 | Status: DISCONTINUED | COMMUNITY
End: 2024-02-27

## 2024-02-27 NOTE — PHYSICAL EXAM
[Normal] : normal skin color and pigmentation [Normal Affect] : the affect was normal [Normal Insight/Judgment] : insight and judgment were intact [Normal Mood] : the mood was normal [de-identified] : mild right kerry

## 2024-02-27 NOTE — HISTORY OF PRESENT ILLNESS
[One fall no injury in past year] : Patient reported one fall in the past year without injury [] : Assistance needed with housekeeping [1] : 2) Feeling down, depressed, or hopeless for several days (1) [FreeTextEntry1] : The patient is a 89-year-old woman who was last seen by myself on November 28, 2023.  The patient's past medical history is significant for anxiety, depression, asthma, atrial fibrillation, chronic fatigue, GERD, tremor, hyperlipidemia, MIRA, osteoarthritis, history of CVA, history of TAVR.  The patient requires a home health aide 7 days/week.  The patient lives with her spouse.  The patient's chief concern is longstanding gait instability. [[id number]] : 2

## 2024-02-27 NOTE — ASSESSMENT
[FreeTextEntry1] : The patient is a 89-year-old woman with a history of progressive frailty, history of TAVR, GERD, atrial fibrillation, asthma, depression, anxiety.  She lives with her  and a 7-day home health aide.  She is status post a recent fall without injury.  She was seen by cardiology this month.  She is stable from a cardiopulmonary point of view.  Will continue as previously outlined.

## 2024-03-24 ENCOUNTER — RX RENEWAL (OUTPATIENT)
Age: 89
End: 2024-03-24

## 2024-03-24 RX ORDER — DIGOXIN 125 UG/1
125 TABLET ORAL
Qty: 90 | Refills: 1 | Status: ACTIVE | COMMUNITY
Start: 2017-06-30 | End: 1900-01-01

## 2024-05-15 RX ORDER — DILTIAZEM HYDROCHLORIDE 300 MG/1
300 CAPSULE, EXTENDED RELEASE ORAL
Qty: 90 | Refills: 2 | Status: ACTIVE | COMMUNITY
Start: 2021-05-20 | End: 1900-01-01

## 2024-05-18 ENCOUNTER — RX RENEWAL (OUTPATIENT)
Age: 89
End: 2024-05-18

## 2024-05-18 RX ORDER — DILTIAZEM HYDROCHLORIDE 300 MG/1
300 CAPSULE, EXTENDED RELEASE ORAL
Qty: 90 | Refills: 2 | Status: ACTIVE | COMMUNITY
Start: 2023-05-24 | End: 1900-01-01

## 2024-07-30 ENCOUNTER — APPOINTMENT (OUTPATIENT)
Dept: GERIATRICS | Facility: CLINIC | Age: 89
End: 2024-07-30
Payer: MEDICARE

## 2024-07-30 VITALS
RESPIRATION RATE: 22 BRPM | TEMPERATURE: 97.6 F | SYSTOLIC BLOOD PRESSURE: 159 MMHG | DIASTOLIC BLOOD PRESSURE: 88 MMHG | BODY MASS INDEX: 28.88 KG/M2 | OXYGEN SATURATION: 98 % | HEART RATE: 84 BPM | WEIGHT: 143 LBS

## 2024-07-30 DIAGNOSIS — G25.0 ESSENTIAL TREMOR: ICD-10-CM

## 2024-07-30 DIAGNOSIS — Z86.73 PERSONAL HISTORY OF TRANSIENT ISCHEMIC ATTACK (TIA), AND CEREBRAL INFARCTION W/OUT RESIDUAL DEFICITS: ICD-10-CM

## 2024-07-30 DIAGNOSIS — R26.81 UNSTEADINESS ON FEET: ICD-10-CM

## 2024-07-30 DIAGNOSIS — I48.91 UNSPECIFIED ATRIAL FIBRILLATION: ICD-10-CM

## 2024-07-30 DIAGNOSIS — R53.82 CHRONIC FATIGUE, UNSPECIFIED: ICD-10-CM

## 2024-07-30 DIAGNOSIS — R41.3 OTHER AMNESIA: ICD-10-CM

## 2024-07-30 DIAGNOSIS — Z95.3 PRESENCE OF XENOGENIC HEART VALVE: ICD-10-CM

## 2024-07-30 PROCEDURE — 99214 OFFICE O/P EST MOD 30 MIN: CPT

## 2024-07-30 PROCEDURE — G2211 COMPLEX E/M VISIT ADD ON: CPT

## 2024-07-30 NOTE — REASON FOR VISIT
[Follow-Up] : a follow-up visit [Formal Caregiver] : formal caregiver [FreeTextEntry1] : fatigue, gait isnstability, anxiety

## 2024-07-30 NOTE — ASSESSMENT
[FreeTextEntry1] : The patient is a 89-year-old woman with a past medical history significant for anxiety, depression, asthma, atrial fibrillation, chronic fatigue, GERD, essential tremor, hyperlipidemia, osteoarthritis, history of CVA, history of TAVR.  The patient is dependent in all of her ADLs and IADLs.  She lives with her spouse and a home health aide.  She is having progressive memory issues.  She denies any new medical problems she has not been hospitalized.  She is on digoxin for rate control.  Will check digoxin level and electrolytes.

## 2024-07-30 NOTE — PHYSICAL EXAM
[Normal] : normal skin color and pigmentation [Normal Affect] : the affect was normal [Normal Insight/Judgment] : insight and judgment were intact [Normal Mood] : the mood was normal [de-identified] : mild right kerry, tremorosu voice

## 2024-07-30 NOTE — HISTORY OF PRESENT ILLNESS
[No falls in past year] : Patient reported no falls in the past year [Independent] : toileting [] : Assistance needed with transferring/mobility. [Completely Dependent] : Completely dependent. [FreeTextEntry1] : The patient is a 89-year-old woman who was last seen by myself on February 27, 2024.  The patient's past medical history is significant for anxiety, depression, asthma, atrial fibrillation, chronic fatigue, GERD, tremor, hyperlipidemia, MIRA, osteoarthritis, history of CVA, history of TAVR.  The patient requires a home health aide 7 days/week she was with her spouse.

## 2024-08-01 LAB
ANION GAP SERPL CALC-SCNC: 18 MMOL/L
BUN SERPL-MCNC: 22 MG/DL
CALCIUM SERPL-MCNC: 9.9 MG/DL
CHLORIDE SERPL-SCNC: 103 MMOL/L
CO2 SERPL-SCNC: 20 MMOL/L
CREAT SERPL-MCNC: 0.99 MG/DL
DIGOXIN SERPL-MCNC: 1.7 NG/ML
EGFR: 54 ML/MIN/1.73M2
GLUCOSE SERPL-MCNC: 68 MG/DL
POTASSIUM SERPL-SCNC: 6.5 MMOL/L
SODIUM SERPL-SCNC: 142 MMOL/L

## 2024-08-02 LAB
ANION GAP SERPL CALC-SCNC: 14 MMOL/L
BUN SERPL-MCNC: 21 MG/DL
CALCIUM SERPL-MCNC: 9.6 MG/DL
CHLORIDE SERPL-SCNC: 106 MMOL/L
CO2 SERPL-SCNC: 22 MMOL/L
CREAT SERPL-MCNC: 0.98 MG/DL
EGFR: 54 ML/MIN/1.73M2
GLUCOSE SERPL-MCNC: 106 MG/DL
POTASSIUM SERPL-SCNC: 4.8 MMOL/L
SODIUM SERPL-SCNC: 142 MMOL/L

## 2024-08-05 ENCOUNTER — APPOINTMENT (OUTPATIENT)
Dept: CARDIOLOGY | Facility: CLINIC | Age: 89
End: 2024-08-05

## 2024-08-05 NOTE — PATIENT PROFILE ADULT - MEDICATIONS/VISITS
From: Alon Leal  To: Dr. Belinda Gordon  Sent: 8/2/2024 2:58 PM EDT  Subject: Right Thigh    Dr Gordon:  I got a text message from [facility] about my MRI.  I called them to schedule and also asked if they had an open MRI.  I guess they do not.  Could I please go to [facility] where they do have an open MRI?   no

## 2024-08-16 ENCOUNTER — RX RENEWAL (OUTPATIENT)
Age: 89
End: 2024-08-16

## 2024-10-17 NOTE — H&P PST ADULT - PATIENT ON (OXYGEN DELIVERY METHOD)
well developed, well nourished , in no acute distress , ambulating without difficulty , normal communication ability
Preoperative clearance
room air

## 2024-11-18 ENCOUNTER — RX RENEWAL (OUTPATIENT)
Age: 89
End: 2024-11-18

## 2024-11-19 ENCOUNTER — NON-APPOINTMENT (OUTPATIENT)
Age: 89
End: 2024-11-19

## 2024-11-19 ENCOUNTER — APPOINTMENT (OUTPATIENT)
Dept: GERIATRICS | Facility: CLINIC | Age: 89
End: 2024-11-19
Payer: MEDICARE

## 2024-11-19 VITALS
RESPIRATION RATE: 20 BRPM | HEART RATE: 101 BPM | OXYGEN SATURATION: 99 % | BODY MASS INDEX: 29.08 KG/M2 | DIASTOLIC BLOOD PRESSURE: 75 MMHG | WEIGHT: 144 LBS | TEMPERATURE: 97.2 F | SYSTOLIC BLOOD PRESSURE: 148 MMHG

## 2024-11-19 DIAGNOSIS — G25.0 ESSENTIAL TREMOR: ICD-10-CM

## 2024-11-19 DIAGNOSIS — Z71.89 OTHER SPECIFIED COUNSELING: ICD-10-CM

## 2024-11-19 DIAGNOSIS — R26.81 UNSTEADINESS ON FEET: ICD-10-CM

## 2024-11-19 DIAGNOSIS — I48.91 UNSPECIFIED ATRIAL FIBRILLATION: ICD-10-CM

## 2024-11-19 DIAGNOSIS — R41.3 OTHER AMNESIA: ICD-10-CM

## 2024-11-19 DIAGNOSIS — I10 ESSENTIAL (PRIMARY) HYPERTENSION: ICD-10-CM

## 2024-11-19 DIAGNOSIS — F41.9 ANXIETY DISORDER, UNSPECIFIED: ICD-10-CM

## 2024-11-19 DIAGNOSIS — R45.851 SUICIDAL IDEATIONS: ICD-10-CM

## 2024-11-19 DIAGNOSIS — F32.A ANXIETY DISORDER, UNSPECIFIED: ICD-10-CM

## 2024-11-19 PROCEDURE — 99214 OFFICE O/P EST MOD 30 MIN: CPT

## 2024-11-19 PROCEDURE — G2211 COMPLEX E/M VISIT ADD ON: CPT

## 2024-11-19 PROCEDURE — 90662 IIV NO PRSV INCREASED AG IM: CPT

## 2024-11-19 PROCEDURE — G0008: CPT

## 2024-11-25 ENCOUNTER — APPOINTMENT (OUTPATIENT)
Dept: GERIATRICS | Facility: CLINIC | Age: 89
End: 2024-11-25

## 2025-03-03 ENCOUNTER — APPOINTMENT (OUTPATIENT)
Dept: GERIATRICS | Facility: CLINIC | Age: 89
End: 2025-03-03

## 2025-04-30 NOTE — CONSULT NOTE ADULT - ASSESSMENT
Preop Patient Instructions for [facility] and Procedures    Welcome! We want you to have the best experience! Thank you for choosing us and trusting us with your care. If you have any questions regarding your preop instructions, please call:  [phone] 7:30am-4pm M-F  IF you have questions day before surgery before 8:30pm or morning of surgery after 5am THEN call [phone]   Call your surgeon immediately if you feel that you cannot make it for surgery for any reason (i.e. cold symptoms, fever, family emergency etc.).    WHEN SHOULD I ARRIVE?    Your hospital arrival time will be given the afternoon of the business day prior to your surgical date, via text message or phone call after 4:00 PM. Once you accept the text, we will know you received your arrival time. If your surgery falls the day after a holiday or on a Monday, you will be notified the prior business day.     WHERE DO I GO?    When driving, follow the signs to the Main Entrance, and then follow signs for Parking lot A, which is located to the right of the Main Entrance if you are facing the building.   is available M-F 8-4pm. Walk through the main entrance on the ground level. Then, walk to the East elevators, which are located past the front lobby desk on the right. The [facility] is located up one level on the first floor. Exit the elevator and the reception desk is to your right.    TRANSPORTATION    IMPORTANT SAFETY! You must have a ride arranged to get home and have a responsible adult stay with you for 24 hours after the surgery. You cannot drive or use public transportation or rideshare by yourself.    CAN I HAVE VISITORS?    Yes, two visitors are allowed in the [facility] due to space limitations. Additional visitors would be directed to the waiting area. More visitors may be allowed in the overnight units during visitor hours. Minor visitors must be accompanied by an adult at all times.  The  clinical team has discretion to limit visitors if a visitor presents a health or safety risk to the patient, themselves, or the care team.   Visitor accommodations are subject to change depending on community infection concerns.      WHAT SHOULD I BRING?    Photo ID and insurance card  Do not bring valuables to the hospital  Bring any asthma/COPD inhalers, eyeglasses, dentures or hearing aids (bring cases)  Home medication list    IF you are staying overnight bring:  CPAP machine - if you use one for sleep apnea.  Self-care items such as hairbrush or hair tie.    HOW TO DRESS, SHOWER OR BATHE THE NIGHT BEFORE SURGERY?    CHG Instructions  Before surgery patients take an important role in prevention of a surgical site infection by using a special wash. A preoperative shower or bath with a special soap called chlorhexidine gluconate (CHG) 4% will need to be done. A common name for this soap is Hibiclens or Aplicare, but any of the mentioned brands are acceptable for use. If there is an allergy to CHG or for any other reason that washing with CHG is not possible, please follow the instructions below BUT use an antibacterial soap.    Patient Instructions for Skin Cleaning for baths or showers:   Please read the \"Drug Facts\" for CHG information and directions on the bottle:   Do not use on the head or face, eyes, ears or mouth.   Do not use in the genital area.   Do not use directly on stoma for ostomy.  Do not use if allergic to chlorhexidine gluconate or any other Ingredient listed. Instead use an antibacterial soap.    Steps for a Bath or Shower the Night Before and Morning of Surgery:  Please remove any nail polish including toes.  When bathing or showering wash hair as usual with regular shampoo.  Rinse hair and body thoroughly to remove any shampoo residue.   Wash face with regular soap or water only.   Wash genital area with regular soap or water only, NOT with CHG.   Thoroughly rinse body with warm water from the  neck down.   Turn off the water to prevent rinsing the CHG soap off too soon.   Apply CHG soap and leave on for one minute. Pay special attention to the area of surgery.   Avoid any open skin areas including open wounds. Your physician should be made aware of these wounds and provide instructions for them.  If having back surgery, please have someone assist in applying CHG to the back area.   Rinse thoroughly with warm water.   Do not use regular soap after applying and rinsing CHG.   Pat dry with a fresh towel.   Do not apply lotion, powders or perfumes.   Do not wear jewelry, this also applies to permanent jewelry. If it is not removed it could result in a burn, or the cancellation of your surgery.   Do not wear makeup or false lashes, including mascara.  Put on clean clothes and sleep in fresh bed sheets.   Sleeping with pet animals can be a source of infection.   *If a cast or splint is in place that is not to be removed, then sponge bathe exposed skin areas from the neck down.  Please brush and floss the night before and morning of your procedure with a new toothbrush.    WHAT CAN I EAT BEFORE I ARRIVE FOR SURGERY?    Diabetic Patients  Evening before Surgery 8:00 PM - 11:00 PM: To prevent hypoglycemia or low blood sugars, choose a snack from this list:  Pick one carbohydrate Pick one protein   1 slice of bread  1 slice of chicken, turkey, ham, beef or fish    6 crackers  1 slice of cheese    1 cup of milk  1 tablespoon of peanut butter    Small piece of fruit (size of a tennis ball)  1 egg      Morning of Surgery: Check your blood sugar when you wake up the morning of surgery and then every 2 hours or sooner. Studies have shown you can lower your risk for complications when your blood sugar levels are between 90mg/dL to 180mg/dl.    8 hours prior to ARRIVAL time: Only if allowed by your surgeon, you can eat a full meal. No solid foods after this.     2 hours before ARRIVAL time: You may drink approved clear  liquids up to 2 hours before your arrival time.  To ensure you are hydrated please drink 10 oz. of water before the cutoff time.  If your arrival time is before 6am, finish your water by 5am.    Approved Clear liquids: Water, Propel, Gatorade (any color except red), Apple juice without pulp (non-organic); Pedialyte, 7-up/ Sprite, PLAIN Black coffee or tea without milk products or lemon. NO NON-DAIRY CREAMERS *If you have diabetes choose low carb/sugar or no carb/sugar options.     Unacceptable Clear liquids: Coffee or tea with milk products, orange juice, alcohol, soup broth, powder flavoring packets.     IMPORTANT SAFETY! FAILURE TO FOLLOW THIS MAY RESULT IN EITHER DELAY OR CANCELLATION OF YOUR PROCEDURE DUE TO THE RISK OF ASPIRATION.    Do not eat any solid foods on the day of your surgery or it may be cancelled. But, if you experience low blood sugar less than 70 mg/dl follow the instructions below.    What to do for Hypoglycemia (Blood Sugar less than 70 mg/dL)?   Hypoglycemia or low blood sugar can be life threatening if not treated quickly. Symptoms may include but are not limited to: shakiness, sweating, chills, dizziness, weakness, lack of coordination, sleepiness, fast heartbeat, confusion, irritability, seizures or unconsciousness.  Drink ONE of these options until symptoms subside.   Half a cup of apple juice, 3 teaspoons of sugar mixed in water, 1 tablespoon of honey, or 4 Glucose tables purchased at any pharmacy. It is important you purchase at least one of these the day before surgery.  *If your blood sugar is dangerously low, please CALL 911 for any medical emergencies such as seizures or unconsciousness.    What to do for Hyperglycemia (Blood Sugar Above 200 mg/dL)?   Call the Pre-Surgery Nurse at [phone] if your blood sugar is above 200 mg/dL day of surgery.    WHEN SHOULD I STOP TAKING MY MEDICATIONS?    Before Surgery/Procedure Hold (Do Not Take) these Medications  Medications and  Supplements:  - Morning of surgery hold any Vitamins AND for 2 weeks prior hold any Vitamin E or Herbals     Anticoagulation: none on med list    Antidiabetic:    - Morning of surgery hold (SITagliptin (Januvia) 50 MG tablet [[id number]])    KRAIG Risk (Diuretics, ACE-I/ARB, NSAIDs): none on med list      Continue all other medications unless an alternative plan was advised with the surgeon and/or specialist.   Agree with above assessment and plan as outlined above.    - tolerated procedure  - cont tele  - supportive care per CTS      Robi Sheldon MD, [facility]  BEEPER [phone]

## 2025-06-20 ENCOUNTER — APPOINTMENT (OUTPATIENT)
Dept: GERIATRICS | Facility: CLINIC | Age: 89
End: 2025-06-20
Payer: MEDICARE

## 2025-06-20 PROBLEM — M79.18 MUSCULOSKELETAL PAIN: Status: ACTIVE | Noted: 2018-09-11

## 2025-06-20 PROBLEM — M54.50 LOW BACK PAIN: Status: ACTIVE | Noted: 2025-06-20

## 2025-06-20 PROCEDURE — 99214 OFFICE O/P EST MOD 30 MIN: CPT | Mod: 2W

## 2025-06-20 RX ORDER — MELOXICAM 7.5 MG/1
7.5 TABLET ORAL
Qty: 7 | Refills: 0 | Status: ACTIVE | COMMUNITY
Start: 2025-06-20 | End: 1900-01-01

## 2025-06-20 RX ORDER — LIDOCAINE 5% 700 MG/1
5 PATCH TOPICAL
Qty: 7 | Refills: 0 | Status: ACTIVE | COMMUNITY
Start: 2025-06-20 | End: 1900-01-01

## 2025-06-23 ENCOUNTER — NON-APPOINTMENT (OUTPATIENT)
Age: 89
End: 2025-06-23

## 2025-06-23 PROBLEM — M48.50XA COMPRESSION FRACTURE OF VERTEBRAL COLUMN: Status: ACTIVE | Noted: 2025-06-23

## 2025-06-23 RX ORDER — CALCITONIN SALMON 200 [IU]/1
200 SPRAY, METERED NASAL DAILY
Qty: 1 | Refills: 0 | Status: ACTIVE | COMMUNITY
Start: 2025-06-23 | End: 1900-01-01

## 2025-06-23 RX ORDER — SENNOSIDES 8.6 MG/1
8.6 CAPSULE, GELATIN COATED ORAL AT BEDTIME
Qty: 14 | Refills: 0 | Status: ACTIVE | COMMUNITY
Start: 2025-06-23 | End: 1900-01-01

## 2025-06-30 RX ORDER — OXYCODONE 5 MG/1
5 TABLET ORAL
Qty: 21 | Refills: 0 | Status: ACTIVE | COMMUNITY
Start: 2025-06-23 | End: 1900-01-01

## 2025-07-09 ENCOUNTER — APPOINTMENT (OUTPATIENT)
Dept: GERIATRICS | Facility: CLINIC | Age: 89
End: 2025-07-09

## 2025-07-09 PROCEDURE — 99214 OFFICE O/P EST MOD 30 MIN: CPT | Mod: 2W

## 2025-07-09 RX ORDER — QUETIAPINE FUMARATE 25 MG/1
25 TABLET ORAL
Qty: 15 | Refills: 1 | Status: ACTIVE | COMMUNITY
Start: 2025-07-09 | End: 1900-01-01

## 2025-07-14 PROBLEM — M79.89 LEG SWELLING: Status: ACTIVE | Noted: 2025-07-14

## 2025-07-14 LAB
ALBUMIN SERPL ELPH-MCNC: 4 G/DL
ALP BLD-CCNC: 187 U/L
ALT SERPL-CCNC: 27 U/L
ANION GAP SERPL CALC-SCNC: 15 MMOL/L
AST SERPL-CCNC: 28 U/L
BASOPHILS # BLD AUTO: 0.04 K/UL
BASOPHILS NFR BLD AUTO: 0.4 %
BILIRUB SERPL-MCNC: 0.4 MG/DL
BUN SERPL-MCNC: 27 MG/DL
CALCIUM SERPL-MCNC: 9.5 MG/DL
CHLORIDE SERPL-SCNC: 109 MMOL/L
CO2 SERPL-SCNC: 21 MMOL/L
CREAT SERPL-MCNC: 0.69 MG/DL
DIGOXIN SERPL-MCNC: 1.1 NG/ML
EGFRCR SERPLBLD CKD-EPI 2021: 81 ML/MIN/1.73M2
EOSINOPHIL # BLD AUTO: 0.13 K/UL
EOSINOPHIL NFR BLD AUTO: 1.3 %
GLUCOSE SERPL-MCNC: 100 MG/DL
HCT VFR BLD CALC: 38.8 %
HGB BLD-MCNC: 12.4 G/DL
IMM GRANULOCYTES NFR BLD AUTO: 0.4 %
LYMPHOCYTES # BLD AUTO: 2.23 K/UL
LYMPHOCYTES NFR BLD AUTO: 22.1 %
MAN DIFF?: NORMAL
MCHC RBC-ENTMCNC: 29.1 PG
MCHC RBC-ENTMCNC: 32 G/DL
MCV RBC AUTO: 91.1 FL
MONOCYTES # BLD AUTO: 0.93 K/UL
MONOCYTES NFR BLD AUTO: 9.2 %
NEUTROPHILS # BLD AUTO: 6.73 K/UL
NEUTROPHILS NFR BLD AUTO: 66.6 %
PLATELET # BLD AUTO: 426 K/UL
POTASSIUM SERPL-SCNC: 4.1 MMOL/L
PROT SERPL-MCNC: 6.9 G/DL
RBC # BLD: 4.26 M/UL
RBC # FLD: 14.8 %
SODIUM SERPL-SCNC: 144 MMOL/L
TSH SERPL-ACNC: 3.48 UIU/ML
WBC # FLD AUTO: 10.1 K/UL

## 2025-07-16 ENCOUNTER — NON-APPOINTMENT (OUTPATIENT)
Age: 89
End: 2025-07-16

## 2025-07-18 ENCOUNTER — APPOINTMENT (OUTPATIENT)
Dept: GERIATRICS | Facility: CLINIC | Age: 89
End: 2025-07-18

## 2025-07-18 VITALS
HEART RATE: 62 BPM | DIASTOLIC BLOOD PRESSURE: 92 MMHG | RESPIRATION RATE: 15 BRPM | OXYGEN SATURATION: 99 % | SYSTOLIC BLOOD PRESSURE: 162 MMHG | WEIGHT: 136 LBS | BODY MASS INDEX: 27.47 KG/M2 | TEMPERATURE: 98 F

## 2025-07-18 PROBLEM — Z79.01 ANTICOAGULATED: Status: ACTIVE | Noted: 2025-07-09

## 2025-07-18 PROCEDURE — G2211 COMPLEX E/M VISIT ADD ON: CPT

## 2025-07-18 PROCEDURE — 99214 OFFICE O/P EST MOD 30 MIN: CPT

## 2025-07-18 RX ORDER — DULOXETINE 20 MG/1
20 CAPSULE, DELAYED RELEASE ORAL DAILY
Qty: 42 | Refills: 0 | Status: ACTIVE | COMMUNITY
Start: 2025-07-18 | End: 1900-01-01

## 2025-08-06 ENCOUNTER — APPOINTMENT (OUTPATIENT)
Dept: GERIATRICS | Facility: CLINIC | Age: 89
End: 2025-08-06

## 2025-08-06 DIAGNOSIS — F41.9 ANXIETY DISORDER, UNSPECIFIED: ICD-10-CM

## 2025-08-06 DIAGNOSIS — F32.A ANXIETY DISORDER, UNSPECIFIED: ICD-10-CM

## 2025-08-06 PROCEDURE — 99214 OFFICE O/P EST MOD 30 MIN: CPT | Mod: 2W

## 2025-08-08 RX ORDER — MIRTAZAPINE 7.5 MG/1
7.5 TABLET, FILM COATED ORAL
Qty: 30 | Refills: 1 | Status: ACTIVE | COMMUNITY
Start: 2025-08-08 | End: 1900-01-01

## 2025-08-25 PROBLEM — R63.4 WEIGHT LOSS: Status: ACTIVE | Noted: 2025-08-25

## 2025-09-05 ENCOUNTER — APPOINTMENT (OUTPATIENT)
Dept: GERIATRICS | Facility: CLINIC | Age: 89
End: 2025-09-05

## 2025-09-05 DIAGNOSIS — Z91.89 OTHER SPECIFIED PERSONAL RISK FACTORS, NOT ELSEWHERE CLASSIFIED: ICD-10-CM

## 2025-09-05 DIAGNOSIS — Z51.81 ENCOUNTER FOR THERAPEUTIC DRUG LVL MONITORING: ICD-10-CM

## 2025-09-05 DIAGNOSIS — M81.0 AGE-RELATED OSTEOPOROSIS W/OUT CURRENT PATHOLOGICAL FRACTURE: ICD-10-CM

## 2025-09-05 DIAGNOSIS — D75.839 THROMBOCYTOSIS, UNSPECIFIED: ICD-10-CM

## 2025-09-09 ENCOUNTER — LABORATORY RESULT (OUTPATIENT)
Age: 89
End: 2025-09-09

## 2025-09-10 ENCOUNTER — LABORATORY RESULT (OUTPATIENT)
Age: 89
End: 2025-09-10